# Patient Record
Sex: MALE | Race: WHITE | NOT HISPANIC OR LATINO | Employment: FULL TIME | URBAN - METROPOLITAN AREA
[De-identification: names, ages, dates, MRNs, and addresses within clinical notes are randomized per-mention and may not be internally consistent; named-entity substitution may affect disease eponyms.]

---

## 2022-01-11 ENCOUNTER — OFFICE VISIT (OUTPATIENT)
Dept: FAMILY MEDICINE | Age: 42
End: 2022-01-11

## 2022-01-11 VITALS
TEMPERATURE: 98 F | OXYGEN SATURATION: 100 % | HEIGHT: 73 IN | WEIGHT: 190 LBS | HEART RATE: 74 BPM | DIASTOLIC BLOOD PRESSURE: 84 MMHG | SYSTOLIC BLOOD PRESSURE: 128 MMHG | BODY MASS INDEX: 25.18 KG/M2

## 2022-01-11 DIAGNOSIS — R09.89 SINUS COMPLAINT: ICD-10-CM

## 2022-01-11 DIAGNOSIS — U07.1 COVID-19 VIRUS INFECTION: Primary | ICD-10-CM

## 2022-01-11 DIAGNOSIS — B00.1 HERPES LABIALIS: ICD-10-CM

## 2022-01-11 PROCEDURE — 99203 OFFICE O/P NEW LOW 30 MIN: CPT | Performed by: PHYSICIAN ASSISTANT

## 2022-01-11 RX ORDER — AZITHROMYCIN 250 MG/1
TABLET, FILM COATED ORAL
Qty: 6 TABLET | Refills: 0 | Status: SHIPPED | OUTPATIENT
Start: 2022-01-11 | End: 2022-01-16

## 2022-01-11 RX ORDER — VALACYCLOVIR HYDROCHLORIDE 1 G/1
TABLET, FILM COATED ORAL
Qty: 4 TABLET | Refills: 0 | Status: SHIPPED | OUTPATIENT
Start: 2022-01-11 | End: 2022-06-07

## 2022-01-11 ASSESSMENT — ENCOUNTER SYMPTOMS
CHEST TIGHTNESS: 0
SORE THROAT: 0
VOMITING: 0
CONSTIPATION: 0
FEVER: 1
POLYDIPSIA: 0
SHORTNESS OF BREATH: 0
DIARRHEA: 0
FATIGUE: 0
COLOR CHANGE: 0
ABDOMINAL PAIN: 0
SINUS PRESSURE: 1
HEADACHES: 0
NAUSEA: 0
CHILLS: 0
WHEEZING: 0
COUGH: 0

## 2022-03-03 ENCOUNTER — TELEPHONE (OUTPATIENT)
Dept: FAMILY MEDICINE | Age: 42
End: 2022-03-03

## 2022-03-03 DIAGNOSIS — Z00.00 ROUTINE GENERAL MEDICAL EXAMINATION AT A HEALTH CARE FACILITY: Primary | ICD-10-CM

## 2022-03-21 ENCOUNTER — IMAGING SERVICES (OUTPATIENT)
Dept: GENERAL RADIOLOGY | Age: 42
End: 2022-03-21
Attending: FAMILY MEDICINE

## 2022-03-21 ENCOUNTER — OFFICE VISIT (OUTPATIENT)
Dept: SPORTS MEDICINE | Age: 42
End: 2022-03-21

## 2022-03-21 ENCOUNTER — TELEPHONE (OUTPATIENT)
Dept: ORTHOPEDICS CLINIC | Facility: CLINIC | Age: 42
End: 2022-03-21

## 2022-03-21 VITALS — WEIGHT: 190 LBS | BODY MASS INDEX: 25.18 KG/M2 | HEIGHT: 73 IN

## 2022-03-21 DIAGNOSIS — M25.661 DECREASED RANGE OF MOTION (ROM) OF RIGHT KNEE: ICD-10-CM

## 2022-03-21 DIAGNOSIS — M25.561 RIGHT KNEE PAIN, UNSPECIFIED CHRONICITY: ICD-10-CM

## 2022-03-21 DIAGNOSIS — M23.91 ACUTE INTERNAL DERANGEMENT OF RIGHT KNEE: Primary | ICD-10-CM

## 2022-03-21 DIAGNOSIS — S83.511A RUPTURE OF ANTERIOR CRUCIATE LIGAMENT OF RIGHT KNEE, INITIAL ENCOUNTER: ICD-10-CM

## 2022-03-21 DIAGNOSIS — S83.421A TEAR OF LCL (LATERAL COLLATERAL LIGAMENT) OF KNEE, RIGHT, INITIAL ENCOUNTER: ICD-10-CM

## 2022-03-21 PROCEDURE — 99204 OFFICE O/P NEW MOD 45 MIN: CPT | Performed by: FAMILY MEDICINE

## 2022-03-21 PROCEDURE — 20611 DRAIN/INJ JOINT/BURSA W/US: CPT | Performed by: FAMILY MEDICINE

## 2022-03-21 PROCEDURE — 73564 X-RAY EXAM KNEE 4 OR MORE: CPT | Performed by: FAMILY MEDICINE

## 2022-03-21 RX ORDER — LIDOCAINE HYDROCHLORIDE 10 MG/ML
2 INJECTION, SOLUTION INFILTRATION; PERINEURAL
Status: COMPLETED | OUTPATIENT
Start: 2022-03-21 | End: 2022-03-21

## 2022-03-21 RX ORDER — MELOXICAM 15 MG/1
15 TABLET ORAL DAILY
Qty: 14 TABLET | Refills: 0 | Status: SHIPPED | OUTPATIENT
Start: 2022-03-21 | End: 2022-06-07

## 2022-03-21 RX ADMIN — LIDOCAINE HYDROCHLORIDE 2 ML: 10 INJECTION, SOLUTION INFILTRATION; PERINEURAL at 12:18

## 2022-03-21 ASSESSMENT — ENCOUNTER SYMPTOMS
NUMBNESS: 1
WEAKNESS: 0
BRUISES/BLEEDS EASILY: 0
ACTIVITY CHANGE: 0
FEVER: 0
DIARRHEA: 0
SHORTNESS OF BREATH: 0
NAUSEA: 0
PHOTOPHOBIA: 0
COLOR CHANGE: 0
ADENOPATHY: 0
WHEEZING: 0
VOMITING: 0
CHILLS: 1
CHEST TIGHTNESS: 0
BACK PAIN: 0
WOUND: 0

## 2022-03-21 NOTE — TELEPHONE ENCOUNTER
Noted pt is scheduled with a Dr Taj Nettles today through [facility]. No imaging on file. Orders placed for Right Knee XR WB & comparison views. Scheduled & spoke to pt, advising to arrive 30 min early to complete prior to appt. Pt voiced understanding.

## 2022-03-22 ENCOUNTER — TELEPHONE (OUTPATIENT)
Dept: SPORTS MEDICINE | Age: 42
End: 2022-03-22

## 2022-03-22 DIAGNOSIS — S83.511A RUPTURE OF ANTERIOR CRUCIATE LIGAMENT OF RIGHT KNEE, INITIAL ENCOUNTER: ICD-10-CM

## 2022-03-22 DIAGNOSIS — M25.661 DECREASED RANGE OF MOTION (ROM) OF RIGHT KNEE: ICD-10-CM

## 2022-03-22 DIAGNOSIS — M25.561 RIGHT KNEE PAIN, UNSPECIFIED CHRONICITY: ICD-10-CM

## 2022-03-22 DIAGNOSIS — S83.421A TEAR OF LCL (LATERAL COLLATERAL LIGAMENT) OF KNEE, RIGHT, INITIAL ENCOUNTER: ICD-10-CM

## 2022-03-22 DIAGNOSIS — M23.91 ACUTE INTERNAL DERANGEMENT OF RIGHT KNEE: Primary | ICD-10-CM

## 2022-05-25 ENCOUNTER — OFFICE VISIT (OUTPATIENT)
Dept: FAMILY MEDICINE | Age: 42
End: 2022-05-25

## 2022-05-25 ENCOUNTER — APPOINTMENT (OUTPATIENT)
Dept: FAMILY MEDICINE | Age: 42
End: 2022-05-25

## 2022-05-25 VITALS
DIASTOLIC BLOOD PRESSURE: 80 MMHG | OXYGEN SATURATION: 97 % | HEIGHT: 74 IN | HEART RATE: 82 BPM | WEIGHT: 196.8 LBS | SYSTOLIC BLOOD PRESSURE: 105 MMHG | RESPIRATION RATE: 12 BRPM | TEMPERATURE: 98.4 F | BODY MASS INDEX: 25.26 KG/M2

## 2022-05-25 DIAGNOSIS — Z98.890 S/P ACL RECONSTRUCTION: ICD-10-CM

## 2022-05-25 DIAGNOSIS — Z00.01 ENCOUNTER FOR GENERAL ADULT MEDICAL EXAMINATION WITH ABNORMAL FINDINGS: Primary | ICD-10-CM

## 2022-05-25 DIAGNOSIS — Z23 NEED FOR VACCINATION: ICD-10-CM

## 2022-05-25 PROCEDURE — 99396 PREV VISIT EST AGE 40-64: CPT | Performed by: FAMILY MEDICINE

## 2022-05-25 PROCEDURE — 90471 IMMUNIZATION ADMIN: CPT | Performed by: FAMILY MEDICINE

## 2022-05-25 PROCEDURE — 90715 TDAP VACCINE 7 YRS/> IM: CPT | Performed by: FAMILY MEDICINE

## 2022-05-25 RX ORDER — HYDROCODONE BITARTRATE AND ACETAMINOPHEN 5; 325 MG/1; MG/1
1 TABLET ORAL EVERY 6 HOURS PRN
COMMUNITY
End: 2022-06-07

## 2022-05-25 RX ORDER — ASPIRIN 325 MG
325 TABLET ORAL DAILY
COMMUNITY
End: 2022-06-07

## 2022-05-25 RX ORDER — ONDANSETRON 4 MG/1
4 TABLET, FILM COATED ORAL EVERY 8 HOURS PRN
COMMUNITY
End: 2022-06-07

## 2022-05-25 ASSESSMENT — PATIENT HEALTH QUESTIONNAIRE - PHQ9
CLINICAL INTERPRETATION OF PHQ2 SCORE: NO FURTHER SCREENING NEEDED
2. FEELING DOWN, DEPRESSED OR HOPELESS: NOT AT ALL
SUM OF ALL RESPONSES TO PHQ9 QUESTIONS 1 AND 2: 0
SUM OF ALL RESPONSES TO PHQ9 QUESTIONS 1 AND 2: 0
1. LITTLE INTEREST OR PLEASURE IN DOING THINGS: NOT AT ALL

## 2022-05-31 ENCOUNTER — TELEPHONE (OUTPATIENT)
Dept: FAMILY MEDICINE | Age: 42
End: 2022-05-31

## 2022-06-04 ENCOUNTER — TELEPHONE (OUTPATIENT)
Dept: FAMILY MEDICINE | Age: 42
End: 2022-06-04

## 2022-06-04 ENCOUNTER — APPOINTMENT (OUTPATIENT)
Dept: FAMILY MEDICINE | Age: 42
End: 2022-06-04

## 2022-06-07 ENCOUNTER — OFFICE VISIT (OUTPATIENT)
Dept: FAMILY MEDICINE | Age: 42
End: 2022-06-07

## 2022-06-07 VITALS
HEART RATE: 82 BPM | SYSTOLIC BLOOD PRESSURE: 140 MMHG | DIASTOLIC BLOOD PRESSURE: 90 MMHG | BODY MASS INDEX: 25.68 KG/M2 | OXYGEN SATURATION: 99 % | WEIGHT: 200 LBS

## 2022-06-07 DIAGNOSIS — R59.9 ENLARGED LYMPH NODES: ICD-10-CM

## 2022-06-07 DIAGNOSIS — I82.451 ACUTE DEEP VEIN THROMBOSIS (DVT) OF RIGHT PERONEAL VEIN (CMD): Primary | ICD-10-CM

## 2022-06-07 PROCEDURE — 99214 OFFICE O/P EST MOD 30 MIN: CPT | Performed by: PHYSICIAN ASSISTANT

## 2022-06-07 RX ORDER — RIVAROXABAN 15 MG-20MG
KIT ORAL
COMMUNITY
Start: 2022-05-31

## 2022-06-07 RX ORDER — OXYCODONE HYDROCHLORIDE AND ACETAMINOPHEN 5; 325 MG/1; MG/1
1 TABLET ORAL
COMMUNITY
Start: 2022-05-12

## 2022-06-29 ENCOUNTER — TELEPHONE (OUTPATIENT)
Dept: PEDIATRICS | Age: 42
End: 2022-06-29

## 2022-06-29 DIAGNOSIS — Z29.89 NEED FOR PROPHYLACTIC IMMUNOTHERAPY: Primary | ICD-10-CM

## 2022-07-08 ENCOUNTER — LAB SERVICES (OUTPATIENT)
Dept: LAB | Age: 42
End: 2022-07-08

## 2022-07-08 DIAGNOSIS — Z00.01 ENCOUNTER FOR GENERAL ADULT MEDICAL EXAMINATION WITH ABNORMAL FINDINGS: ICD-10-CM

## 2022-07-08 DIAGNOSIS — Z00.00 ROUTINE GENERAL MEDICAL EXAMINATION AT A HEALTH CARE FACILITY: ICD-10-CM

## 2022-07-08 LAB
ALBUMIN SERPL-MCNC: 4.6 G/DL (ref 3.6–5.1)
ALP SERPL-CCNC: 113 U/L (ref 45–115)
ALT SERPL W/O P-5'-P-CCNC: 51 U/L (ref 5–49)
AST SERPL-CCNC: 32 U/L (ref 14–43)
BASOPHIL %: 0.6 % (ref 0–1.2)
BASOPHIL ABSOLUTE #: 0 10*3/UL (ref 0–0.1)
BILIRUB SERPL-MCNC: 0.9 MG/DL (ref 0–1.3)
BUN SERPL-MCNC: 20 MG/DL (ref 6–27)
CALCIUM SERPL-MCNC: 10 MG/DL (ref 8.6–10.6)
CHLORIDE SERPL-SCNC: 104 MMOL/L (ref 96–107)
CHOLEST SERPL-MCNC: 247 MG/DL (ref 140–200)
CO2 SERPL-SCNC: 29 MMOL/L (ref 22–32)
CREAT SERPL-MCNC: 1 MG/DL (ref 0.6–1.6)
DIFFERENTIAL TYPE: NORMAL
EOSINOPHIL %: 4.8 % (ref 0–10)
EOSINOPHIL ABSOLUTE #: 0.3 10*3/UL (ref 0–0.5)
GFR SERPL CREATININE-BSD FRML MDRD: >60 ML/MIN/{1.73M2}
GFR SERPL CREATININE-BSD FRML MDRD: >60 ML/MIN/{1.73M2}
GLUCOSE P FAST SERPL-MCNC: 97 MG/DL (ref 60–100)
HDLC SERPL-MCNC: 38 MG/DL
HEMATOCRIT: 43.8 % (ref 40–51)
HEMOGLOBIN: 14.7 G/DL (ref 13.7–17.5)
IMMATURE GRANULOCYTE ABSOLUTE: 0.01 10*3/UL (ref 0–0.05)
IMMATURE GRANULOCYTE PERCENT: 0.1 % (ref 0–0.5)
LDLC SERPL CALC-MCNC: 180 MG/DL (ref 30–100)
LYMPH PERCENT: 36.7 % (ref 20.5–51.1)
LYMPHOCYTE ABSOLUTE #: 2.6 10*3/UL (ref 1.2–3.4)
MEAN CORPUSCULAR HGB CONCENTRATION: 33.6 % (ref 32–36)
MEAN CORPUSCULAR HGB: 29.6 PG (ref 27–34)
MEAN CORPUSCULAR VOLUME: 88.3 FL (ref 79–95)
MEAN PLATELET VOLUME: 10.3 FL (ref 8.6–12.4)
MONOCYTE ABSOLUTE #: 0.5 10*3/UL (ref 0.2–0.9)
MONOCYTE PERCENT: 7.6 % (ref 4.3–12.9)
NEUTROPHIL ABSOLUTE #: 3.6 10*3/UL (ref 1.4–6.5)
NEUTROPHIL PERCENT: 50.2 % (ref 34–73.5)
PLATELET COUNT: 351 10*3/UL (ref 150–400)
POTASSIUM SERPL-SCNC: 4.8 MMOL/L (ref 3.5–5.3)
PROT SERPL-MCNC: 7.3 G/DL (ref 6.4–8.5)
RED BLOOD CELL COUNT: 4.96 10*6/UL (ref 3.9–5.7)
RED CELL DISTRIBUTION WIDTH: 12.9 % (ref 11.3–14.8)
SODIUM SERPL-SCNC: 139 MMOL/L (ref 136–146)
TESTOST SERPL-MCNC: 190 NG/DL (ref 200–813)
TRIGL SERPL-MCNC: 144 MG/DL (ref 0–200)
TSH SERPL DL<=0.05 MIU/L-ACNC: 2.25 M[IU]/L (ref 0.3–4.82)
WHITE BLOOD CELL COUNT: 7.1 10*3/UL (ref 4–10)

## 2022-07-08 PROCEDURE — 80061 LIPID PANEL: CPT | Performed by: FAMILY MEDICINE

## 2022-07-08 PROCEDURE — 84403 ASSAY OF TOTAL TESTOSTERONE: CPT | Performed by: FAMILY MEDICINE

## 2022-07-08 PROCEDURE — 36415 COLL VENOUS BLD VENIPUNCTURE: CPT | Performed by: FAMILY MEDICINE

## 2022-07-08 PROCEDURE — 80050 GENERAL HEALTH PANEL: CPT | Performed by: FAMILY MEDICINE

## 2022-07-12 ENCOUNTER — TELEPHONE (OUTPATIENT)
Dept: FAMILY MEDICINE | Age: 42
End: 2022-07-12

## 2022-12-23 ENCOUNTER — HOSPITAL ENCOUNTER (EMERGENCY)
Facility: HOSPITAL | Age: 42
Discharge: HOME | End: 2022-12-23
Attending: EMERGENCY MEDICINE
Payer: COMMERCIAL

## 2022-12-23 ENCOUNTER — APPOINTMENT (OUTPATIENT)
Dept: CV DIAGNOSTICS | Facility: HOSPITAL | Age: 42
End: 2022-12-23
Attending: EMERGENCY MEDICINE
Payer: COMMERCIAL

## 2022-12-23 ENCOUNTER — APPOINTMENT (OUTPATIENT)
Dept: GENERAL RADIOLOGY | Facility: HOSPITAL | Age: 42
End: 2022-12-23
Attending: EMERGENCY MEDICINE
Payer: COMMERCIAL

## 2022-12-23 VITALS
DIASTOLIC BLOOD PRESSURE: 89 MMHG | OXYGEN SATURATION: 99 % | TEMPERATURE: 99 F | SYSTOLIC BLOOD PRESSURE: 133 MMHG | BODY MASS INDEX: 25.67 KG/M2 | WEIGHT: 200 LBS | RESPIRATION RATE: 14 BRPM | HEART RATE: 80 BPM | HEIGHT: 74 IN

## 2022-12-23 DIAGNOSIS — I15.9 SECONDARY HYPERTENSION: ICD-10-CM

## 2022-12-23 DIAGNOSIS — R07.9 CHEST PAIN OF UNCERTAIN ETIOLOGY: Primary | ICD-10-CM

## 2022-12-23 LAB
ALBUMIN SERPL-MCNC: 3.9 G/DL (ref 3.4–5)
ALBUMIN/GLOB SERPL: 1.1 {RATIO} (ref 1–2)
ALP LIVER SERPL-CCNC: 85 U/L
ALT SERPL-CCNC: 32 U/L
ANION GAP SERPL CALC-SCNC: 4 MMOL/L (ref 0–18)
AST SERPL-CCNC: 17 U/L (ref 15–37)
ATRIAL RATE: 104 BPM
BASOPHILS # BLD AUTO: 0.04 X10(3) UL (ref 0–0.2)
BASOPHILS NFR BLD AUTO: 0.5 %
BILIRUB SERPL-MCNC: 1.4 MG/DL (ref 0.1–2)
BUN BLD-MCNC: 14 MG/DL (ref 7–18)
CALCIUM BLD-MCNC: 9.7 MG/DL (ref 8.5–10.1)
CHLORIDE SERPL-SCNC: 104 MMOL/L (ref 98–112)
CO2 SERPL-SCNC: 28 MMOL/L (ref 21–32)
CREAT BLD-MCNC: 1.22 MG/DL
D DIMER PPP FEU-MCNC: <0.27 UG/ML FEU (ref ?–0.5)
EOSINOPHIL # BLD AUTO: 0.25 X10(3) UL (ref 0–0.7)
EOSINOPHIL NFR BLD AUTO: 3.1 %
ERYTHROCYTE [DISTWIDTH] IN BLOOD BY AUTOMATED COUNT: 14.4 %
GFR SERPLBLD BASED ON 1.73 SQ M-ARVRAT: 75 ML/MIN/1.73M2 (ref 60–?)
GLOBULIN PLAS-MCNC: 3.6 G/DL (ref 2.8–4.4)
GLUCOSE BLD-MCNC: 111 MG/DL (ref 70–99)
HCT VFR BLD AUTO: 52.1 %
HGB BLD-MCNC: 17.3 G/DL
IMM GRANULOCYTES # BLD AUTO: 0.02 X10(3) UL (ref 0–1)
IMM GRANULOCYTES NFR BLD: 0.2 %
LYMPHOCYTES # BLD AUTO: 1.82 X10(3) UL (ref 1–4)
LYMPHOCYTES NFR BLD AUTO: 22.5 %
MCH RBC QN AUTO: 29 PG (ref 26–34)
MCHC RBC AUTO-ENTMCNC: 33.2 G/DL (ref 31–37)
MCV RBC AUTO: 87.4 FL
MONOCYTES # BLD AUTO: 0.68 X10(3) UL (ref 0.1–1)
MONOCYTES NFR BLD AUTO: 8.4 %
NEUTROPHILS # BLD AUTO: 5.29 X10 (3) UL (ref 1.5–7.7)
NEUTROPHILS # BLD AUTO: 5.29 X10(3) UL (ref 1.5–7.7)
NEUTROPHILS NFR BLD AUTO: 65.3 %
NT-PROBNP SERPL-MCNC: 17 PG/ML (ref ?–125)
OSMOLALITY SERPL CALC.SUM OF ELEC: 283 MOSM/KG (ref 275–295)
P AXIS: 65 DEGREES
P-R INTERVAL: 150 MS
PLATELET # BLD AUTO: 267 10(3)UL (ref 150–450)
POTASSIUM SERPL-SCNC: 4.2 MMOL/L (ref 3.5–5.1)
PROT SERPL-MCNC: 7.5 G/DL (ref 6.4–8.2)
Q-T INTERVAL: 324 MS
QRS DURATION: 100 MS
QTC CALCULATION (BEZET): 426 MS
R AXIS: -46 DEGREES
RBC # BLD AUTO: 5.96 X10(6)UL
SARS-COV-2 RNA RESP QL NAA+PROBE: NOT DETECTED
SODIUM SERPL-SCNC: 136 MMOL/L (ref 136–145)
T AXIS: 43 DEGREES
TROPONIN I HIGH SENSITIVITY: 4 NG/L
VENTRICULAR RATE: 104 BPM
WBC # BLD AUTO: 8.1 X10(3) UL (ref 4–11)

## 2022-12-23 PROCEDURE — 99285 EMERGENCY DEPT VISIT HI MDM: CPT

## 2022-12-23 PROCEDURE — 85379 FIBRIN DEGRADATION QUANT: CPT | Performed by: EMERGENCY MEDICINE

## 2022-12-23 PROCEDURE — 84484 ASSAY OF TROPONIN QUANT: CPT | Performed by: EMERGENCY MEDICINE

## 2022-12-23 PROCEDURE — 85025 COMPLETE CBC W/AUTO DIFF WBC: CPT | Performed by: EMERGENCY MEDICINE

## 2022-12-23 PROCEDURE — 80053 COMPREHEN METABOLIC PANEL: CPT | Performed by: EMERGENCY MEDICINE

## 2022-12-23 PROCEDURE — 93350 STRESS TTE ONLY: CPT | Performed by: EMERGENCY MEDICINE

## 2022-12-23 PROCEDURE — 93010 ELECTROCARDIOGRAM REPORT: CPT

## 2022-12-23 PROCEDURE — 93005 ELECTROCARDIOGRAM TRACING: CPT

## 2022-12-23 PROCEDURE — 83880 ASSAY OF NATRIURETIC PEPTIDE: CPT | Performed by: EMERGENCY MEDICINE

## 2022-12-23 PROCEDURE — 93017 CV STRESS TEST TRACING ONLY: CPT | Performed by: EMERGENCY MEDICINE

## 2022-12-23 PROCEDURE — 71045 X-RAY EXAM CHEST 1 VIEW: CPT | Performed by: EMERGENCY MEDICINE

## 2022-12-23 PROCEDURE — 36415 COLL VENOUS BLD VENIPUNCTURE: CPT

## 2022-12-23 RX ORDER — AMLODIPINE BESYLATE 5 MG/1
5 TABLET ORAL DAILY
Qty: 30 TABLET | Refills: 0 | Status: SHIPPED | OUTPATIENT
Start: 2022-12-23 | End: 2023-01-22

## 2022-12-23 RX ORDER — ASPIRIN 81 MG/1
324 TABLET, CHEWABLE ORAL ONCE
Status: COMPLETED | OUTPATIENT
Start: 2022-12-23 | End: 2022-12-23

## 2022-12-23 NOTE — PROGRESS NOTES
Cardio Diagnostics:    Patient walked 10 minutes on a manual Parviz protocol achieving 91% of APMHR. Pt c/o \"chest fluttering\" sensation during early exercise but resolved at peak. Echo images pending. Test turned into [facility].

## 2022-12-23 NOTE — DISCHARGE INSTRUCTIONS
Follow-up with your own primary care physician. Recheck your blood pressure if is persistently over 160/90 start the Norvasc otherwise just follow-up with your doctor for further evaluation. If you have any severe chest pain or shortness of breath return to the emergency room. You were seen in the emergency room in a limited time. There is a possibility that although we do not see any acute process at this present time that things can change with time. Is therefore imperative that you follow-up with primary care physician for close follow-up. If there is any significant progression of your pain  or other symptoms you to return immediately to the emergency room.

## 2022-12-23 NOTE — ED INITIAL ASSESSMENT (HPI)
Pt reports he took Cialis on Monday and last Friday and since has been feeling his blood pressure has been going through the roof and chest pain. + SOB and dizzy at work

## 2023-04-24 ENCOUNTER — HOSPITAL ENCOUNTER (OUTPATIENT)
Age: 43
Discharge: HOME | End: 2023-04-24
Payer: COMMERCIAL

## 2023-04-24 VITALS
RESPIRATION RATE: 18 BRPM | HEART RATE: 83 BPM | OXYGEN SATURATION: 98 % | TEMPERATURE: 98 F | DIASTOLIC BLOOD PRESSURE: 80 MMHG | SYSTOLIC BLOOD PRESSURE: 140 MMHG

## 2023-04-24 DIAGNOSIS — J02.9 VIRAL PHARYNGITIS: ICD-10-CM

## 2023-04-24 DIAGNOSIS — J01.00 ACUTE NON-RECURRENT MAXILLARY SINUSITIS: Primary | ICD-10-CM

## 2023-04-24 LAB — S PYO AG THROAT QL: NEGATIVE

## 2023-04-24 PROCEDURE — 99203 OFFICE O/P NEW LOW 30 MIN: CPT | Performed by: PHYSICIAN ASSISTANT

## 2023-04-24 PROCEDURE — 87880 STREP A ASSAY W/OPTIC: CPT | Performed by: PHYSICIAN ASSISTANT

## 2023-04-24 RX ORDER — AMOXICILLIN AND CLAVULANATE POTASSIUM 875; 125 MG/1; MG/1
1 TABLET, FILM COATED ORAL 2 TIMES DAILY
Qty: 20 TABLET | Refills: 0 | Status: SHIPPED | OUTPATIENT
Start: 2023-04-24 | End: 2023-05-04

## 2023-04-24 NOTE — DISCHARGE INSTRUCTIONS
Push clear fluids. Continue Flonase. Antihistamine such as Zyrtec or Claritin. Antibiotic as written.   Throw a toothbrush on day 3 of treatment

## 2023-08-07 ENCOUNTER — HOSPITAL ENCOUNTER (EMERGENCY)
Facility: HOSPITAL | Age: 43
Discharge: HOME | End: 2023-08-07
Attending: EMERGENCY MEDICINE
Payer: COMMERCIAL

## 2023-08-07 ENCOUNTER — APPOINTMENT (OUTPATIENT)
Dept: ULTRASOUND IMAGING | Facility: HOSPITAL | Age: 43
End: 2023-08-07
Attending: EMERGENCY MEDICINE
Payer: COMMERCIAL

## 2023-08-07 ENCOUNTER — APPOINTMENT (OUTPATIENT)
Dept: CT IMAGING | Facility: HOSPITAL | Age: 43
End: 2023-08-07
Attending: EMERGENCY MEDICINE
Payer: COMMERCIAL

## 2023-08-07 VITALS
WEIGHT: 195 LBS | SYSTOLIC BLOOD PRESSURE: 144 MMHG | RESPIRATION RATE: 16 BRPM | HEIGHT: 74 IN | DIASTOLIC BLOOD PRESSURE: 78 MMHG | BODY MASS INDEX: 25.03 KG/M2 | HEART RATE: 78 BPM | OXYGEN SATURATION: 97 % | TEMPERATURE: 98 F

## 2023-08-07 DIAGNOSIS — R06.02 SHORTNESS OF BREATH: Primary | ICD-10-CM

## 2023-08-07 LAB
ALBUMIN SERPL-MCNC: 4.5 G/DL (ref 3.4–5)
ALBUMIN/GLOB SERPL: 1.3 {RATIO} (ref 1–2)
ALP LIVER SERPL-CCNC: 94 U/L
ALT SERPL-CCNC: 52 U/L
ANION GAP SERPL CALC-SCNC: 8 MMOL/L (ref 0–18)
AST SERPL-CCNC: 55 U/L (ref 15–37)
ATRIAL RATE: 88 BPM
BASOPHILS # BLD AUTO: 0.04 X10(3) UL (ref 0–0.2)
BASOPHILS NFR BLD AUTO: 0.5 %
BILIRUB SERPL-MCNC: 1.3 MG/DL (ref 0.1–2)
BUN BLD-MCNC: 17 MG/DL (ref 7–18)
CALCIUM BLD-MCNC: 9.3 MG/DL (ref 8.5–10.1)
CHLORIDE SERPL-SCNC: 105 MMOL/L (ref 98–112)
CO2 SERPL-SCNC: 23 MMOL/L (ref 21–32)
CREAT BLD-MCNC: 1.22 MG/DL
D DIMER PPP FEU-MCNC: 0.52 UG/ML FEU (ref ?–0.5)
EGFRCR SERPLBLD CKD-EPI 2021: 75 ML/MIN/1.73M2 (ref 60–?)
EOSINOPHIL # BLD AUTO: 0.23 X10(3) UL (ref 0–0.7)
EOSINOPHIL NFR BLD AUTO: 2.9 %
ERYTHROCYTE [DISTWIDTH] IN BLOOD BY AUTOMATED COUNT: 13 %
GLOBULIN PLAS-MCNC: 3.6 G/DL (ref 2.8–4.4)
GLUCOSE BLD-MCNC: 89 MG/DL (ref 70–99)
HCT VFR BLD AUTO: 54 %
HGB BLD-MCNC: 19.3 G/DL
IMM GRANULOCYTES # BLD AUTO: 0.02 X10(3) UL (ref 0–1)
IMM GRANULOCYTES NFR BLD: 0.3 %
LYMPHOCYTES # BLD AUTO: 1.93 X10(3) UL (ref 1–4)
LYMPHOCYTES NFR BLD AUTO: 24.7 %
MCH RBC QN AUTO: 31 PG (ref 26–34)
MCHC RBC AUTO-ENTMCNC: 35.7 G/DL (ref 31–37)
MCV RBC AUTO: 86.7 FL
MONOCYTES # BLD AUTO: 0.67 X10(3) UL (ref 0.1–1)
MONOCYTES NFR BLD AUTO: 8.6 %
NEUTROPHILS # BLD AUTO: 4.92 X10 (3) UL (ref 1.5–7.7)
NEUTROPHILS # BLD AUTO: 4.92 X10(3) UL (ref 1.5–7.7)
NEUTROPHILS NFR BLD AUTO: 63 %
NT-PROBNP SERPL-MCNC: 23 PG/ML (ref ?–125)
OSMOLALITY SERPL CALC.SUM OF ELEC: 283 MOSM/KG (ref 275–295)
P AXIS: 62 DEGREES
P-R INTERVAL: 152 MS
PLATELET # BLD AUTO: 295 10(3)UL (ref 150–450)
POTASSIUM SERPL-SCNC: 3.9 MMOL/L (ref 3.5–5.1)
PROT SERPL-MCNC: 8.1 G/DL (ref 6.4–8.2)
Q-T INTERVAL: 328 MS
QRS DURATION: 106 MS
QTC CALCULATION (BEZET): 396 MS
R AXIS: 238 DEGREES
RBC # BLD AUTO: 6.23 X10(6)UL
SODIUM SERPL-SCNC: 136 MMOL/L (ref 136–145)
T AXIS: 29 DEGREES
TROPONIN I HIGH SENSITIVITY: 7 NG/L
VENTRICULAR RATE: 88 BPM
WBC # BLD AUTO: 7.8 X10(3) UL (ref 4–11)

## 2023-08-07 PROCEDURE — 85025 COMPLETE CBC W/AUTO DIFF WBC: CPT

## 2023-08-07 PROCEDURE — 80053 COMPREHEN METABOLIC PANEL: CPT

## 2023-08-07 PROCEDURE — 83880 ASSAY OF NATRIURETIC PEPTIDE: CPT | Performed by: EMERGENCY MEDICINE

## 2023-08-07 PROCEDURE — 71260 CT THORAX DX C+: CPT | Performed by: EMERGENCY MEDICINE

## 2023-08-07 PROCEDURE — 85379 FIBRIN DEGRADATION QUANT: CPT | Performed by: EMERGENCY MEDICINE

## 2023-08-07 PROCEDURE — 99285 EMERGENCY DEPT VISIT HI MDM: CPT

## 2023-08-07 PROCEDURE — 93970 EXTREMITY STUDY: CPT | Performed by: EMERGENCY MEDICINE

## 2023-08-07 PROCEDURE — 93010 ELECTROCARDIOGRAM REPORT: CPT

## 2023-08-07 PROCEDURE — 99284 EMERGENCY DEPT VISIT MOD MDM: CPT

## 2023-08-07 PROCEDURE — 36415 COLL VENOUS BLD VENIPUNCTURE: CPT

## 2023-08-07 PROCEDURE — 93005 ELECTROCARDIOGRAM TRACING: CPT

## 2023-08-07 PROCEDURE — 84484 ASSAY OF TROPONIN QUANT: CPT

## 2023-08-07 NOTE — DISCHARGE INSTRUCTIONS
Regular activity and exercise is fine. Follow-up with your primary care physician to keep benign your blood pressure.

## 2023-08-07 NOTE — ED INITIAL ASSESSMENT (HPI)
The past 2 week  pt with difficulty in breathing and high blood pressure. With numbness to fingers both hands started on her way here. Pt states he took 2 baby aspirins at 0230 today.

## 2023-08-07 NOTE — ED QUICK NOTES
Pt reevaluated by er physician. Pt informed of his test reports and plan of care.  Verbalizing understanding

## 2024-02-14 ENCOUNTER — HOSPITAL ENCOUNTER (OUTPATIENT)
Age: 44
Discharge: HOME | End: 2024-02-14
Payer: COMMERCIAL

## 2024-02-14 VITALS
RESPIRATION RATE: 18 BRPM | HEART RATE: 90 BPM | TEMPERATURE: 97 F | HEIGHT: 73 IN | WEIGHT: 190 LBS | DIASTOLIC BLOOD PRESSURE: 80 MMHG | BODY MASS INDEX: 25.18 KG/M2 | SYSTOLIC BLOOD PRESSURE: 125 MMHG | OXYGEN SATURATION: 98 %

## 2024-02-14 DIAGNOSIS — J20.9 ACUTE BRONCHITIS, UNSPECIFIED ORGANISM: Primary | ICD-10-CM

## 2024-02-14 PROCEDURE — 99203 OFFICE O/P NEW LOW 30 MIN: CPT | Performed by: NURSE PRACTITIONER

## 2024-02-14 RX ORDER — PREDNISONE 20 MG/1
40 TABLET ORAL DAILY
Qty: 10 TABLET | Refills: 0 | Status: SHIPPED | OUTPATIENT
Start: 2024-02-14 | End: 2024-02-19

## 2024-02-14 RX ORDER — DOXYCYCLINE HYCLATE 100 MG/1
100 CAPSULE ORAL 2 TIMES DAILY
COMMUNITY

## 2024-02-14 RX ORDER — ALBUTEROL SULFATE 90 UG/1
2 AEROSOL, METERED RESPIRATORY (INHALATION) EVERY 4 HOURS PRN
Qty: 1 EACH | Refills: 0 | Status: SHIPPED | OUTPATIENT
Start: 2024-02-14 | End: 2024-03-15

## 2024-02-14 NOTE — ED PROVIDER NOTES
Patient Seen in: [facility]      History     Chief Complaint   Patient presents with    Cough     Stated Complaint: chest discomfort x 3 weeks    Subjective:   43-year-old male presents today with history of URI symptoms and cough.   recently over the last couple days has started feeling chest congestion and shortness of breath.  Does at times feel anxious with this feeling of shortness of breath.  Denies denies any chest pain.   was seen twice in the ER at the end of last year for anxiety and had full cardiac workup at that time which was negative.  Does not take anything for anxiety.  Denies any fever or chills.  No nausea vomiting.  Alert oriented x 3.  No other symptoms or concerns.  The patient's medication list, past medical history and social history elements as listed in today's nurse's notes were reviewed and agreed (except as otherwise stated in the HPI).  The patient's family history reviewed and determined to be noncontributory to the presenting problem            Objective:   History reviewed. No pertinent past medical history.           Past Surgical History:   Procedure Laterality Date    OTHER SURGICAL HISTORY                  Social History     Socioeconomic History    Marital status: Single   Tobacco Use    Smoking status: Former     Packs/day: .75     Types: Cigarettes     Passive exposure: Never    Smokeless tobacco: Never              Review of Systems    Positive for stated complaint: chest discomfort x 3 weeks  Other systems are as noted in HPI.  Constitutional and vital signs reviewed.      All other systems reviewed and negative except as noted above.    Physical Exam     ED Triage Vitals [02/14/24 1500]   /80   Pulse 90   Resp 18   Temp 97 °F (36.1 °C)   Temp src Temporal   SpO2 98 %   O2 Device None (Room air)       Current:/80   Pulse 90   Temp 97 °F (36.1 °C) (Temporal)   Resp 18   Ht 185.4 cm (6' 1\")   Wt 86.2 kg   SpO2 98%   BMI 25.07 kg/m²          Physical Exam  Vitals and nursing note reviewed.   Constitutional:       Appearance: Normal appearance.   HENT:      Head: Normocephalic.      Nose: Nose normal.      Mouth/Throat:      Mouth: Mucous membranes are moist.   Cardiovascular:      Rate and Rhythm: Normal rate and regular rhythm.   Pulmonary:      Effort: Pulmonary effort is normal.      Breath sounds: Normal breath sounds.   Musculoskeletal:      Cervical back: Normal range of motion and neck supple.   Skin:     General: Skin is warm and dry.   Neurological:      Mental Status: He is alert and oriented to person, place, and time.               ED Course   Labs Reviewed - No data to display                          MDM     Please note that this report has been produced using speech recognition software and may contain errors related to that system including, but not limited to, errors in grammar, punctuation, and spelling, as well as words and phrases that possibly may have been recognized inappropriately.  If there are any questions or concerns, contact the dictating provider for clarification.        Note to patient: The 21st Century Cures Act makes medical notes like these available to patients in the interest of transparency. However, this is a medical document intended as peer to peer communication. It is written in medical language and may contain abbreviations or verbiage that are unfamiliar. It may appear blunt or direct. Medical documents are intended to carry relevant information, facts as evident, and the clinical opinion of the practitioner.                                   Medical Decision Making  Differential diagnosis includes but is not limited to: Acute MI, pulmonary embolism, pleurisy, muscle strain, aneurysm, pneumonia, bronchitis, anxiety      Presents today with complaints of feeling chest congestion with cough and intermittent shortness of breath.  States symptoms seem to be worse when laying flat.  Has had mild URI  symptoms and cough over the last couple weeks.  Is currently taking doxycycline for a separate issue.  Lungs were clear on exam.  Patient declines EKG at this time states that had a full cardiac workup in December.  Does not feel like it is his heart.  Patient denies any fever or chills.  Explained to patient could possibly be bronchitis related to his cough or possibly his anxiety.  Will start patient on prednisone and give prescription for albuterol inhaler to use as directed.  To take over-the-counter antihistamine such as Zyrtec Allegra Claritin or Xyzal.  If symptoms do not improve after 1 week to follow-up with primary care physician for further evaluation and care.  Will also give information for [facility] for concerns for anxiety.  Patient verbalized understanding and agreed to plan of care.    Risk  OTC drugs.  Prescription drug management.        Disposition and Plan     Clinical Impression:  1. Acute bronchitis, unspecified organism         Disposition:  Discharge  2/14/2024  3:18 pm    Follow-up:  Estefania Payne MD  [address] [facility] [address] [phone]    Schedule an appointment as soon as possible for a visit       [facility] [address] [phone]    for feelings of anxiety          Medications Prescribed:  Current Discharge Medication List        START taking these medications    Details   predniSONE 20 MG Oral Tab Take 2 tablets (40 mg total) by mouth daily for 5 days.  Qty: 10 tablet, Refills: 0      albuterol 108 (90 Base) MCG/ACT Inhalation Aero Soln Inhale 2 puffs into the lungs every 4 (four) hours as needed for Wheezing.  Qty: 1 each, Refills: 0

## 2024-02-14 NOTE — ED INITIAL ASSESSMENT (HPI)
Patient c/o cough for 2 weeks. States \"it feels cool when I take a deep breath and I cough when I take a deep breath\".

## 2024-06-30 ENCOUNTER — HOSPITAL ENCOUNTER (OUTPATIENT)
Age: 44
Discharge: HOME | End: 2024-06-30
Payer: COMMERCIAL

## 2024-06-30 ENCOUNTER — APPOINTMENT (OUTPATIENT)
Dept: GENERAL RADIOLOGY | Age: 44
End: 2024-06-30
Attending: NURSE PRACTITIONER
Payer: COMMERCIAL

## 2024-06-30 VITALS
HEART RATE: 78 BPM | SYSTOLIC BLOOD PRESSURE: 161 MMHG | DIASTOLIC BLOOD PRESSURE: 98 MMHG | TEMPERATURE: 97 F | OXYGEN SATURATION: 99 % | RESPIRATION RATE: 16 BRPM | BODY MASS INDEX: 24.38 KG/M2 | WEIGHT: 190 LBS | HEIGHT: 74 IN

## 2024-06-30 DIAGNOSIS — S61.451A DOG BITE OF RIGHT HAND, INITIAL ENCOUNTER: Primary | ICD-10-CM

## 2024-06-30 DIAGNOSIS — L03.113 CELLULITIS OF RIGHT UPPER EXTREMITY: ICD-10-CM
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
## 2024-06-30 PROCEDURE — 73130 X-RAY EXAM OF HAND: CPT | Performed by: NURSE PRACTITIONER

## 2024-06-30 PROCEDURE — A9150 MISC/EXPER NON-PRESCRIPT DRU: HCPCS | Performed by: NURSE PRACTITIONER

## 2024-06-30 PROCEDURE — 96365 THER/PROPH/DIAG IV INF INIT: CPT | Performed by: NURSE PRACTITIONER

## 2024-06-30 PROCEDURE — 99213 OFFICE O/P EST LOW 20 MIN: CPT | Performed by: NURSE PRACTITIONER

## 2024-06-30 RX ORDER — ACETAMINOPHEN 500 MG
1000 TABLET ORAL ONCE
Status: COMPLETED | OUTPATIENT
Start: 2024-06-30 | End: 2024-06-30

## 2024-06-30 RX ORDER — AMOXICILLIN AND CLAVULANATE POTASSIUM 875; 125 MG/1; MG/1
1 TABLET, FILM COATED ORAL 2 TIMES DAILY
Qty: 20 TABLET | Refills: 0 | Status: SHIPPED | OUTPATIENT
Start: 2024-06-30 | End: 2024-07-08

## 2024-06-30 NOTE — ED PROVIDER NOTES
Patient Seen in: [facility]      History     Chief Complaint   Patient presents with    Animal Bite     Stated Complaint: dog bite    Subjective:   HPI  43-year-old male presents to me care with complaints of a right hand injury after sustaining a dog bite last night to his wife's dog.  Patient he was playing the dog and the dog suddenly snapped and bit his hand now has an increase in swelling and pain to the dorsal aspect of the hand.  Patient states his last tetanus shot was he thinks about 5 years ago not too sure.  No fever no drainage from the wound.  No other issues complaints concerns.  The patient's medication list, past medical history and social history elements as listed in today's nurse's notes were reviewed and agreed (except as otherwise stated in the HPI).  The patient's family history reviewed and determined to be noncontributory to the presenting problem.      Objective:   History reviewed. No pertinent past medical history.           Past Surgical History:   Procedure Laterality Date    Other surgical history                  Social History     Socioeconomic History    Marital status: Single   Tobacco Use    Smoking status: Former     Current packs/day: 0.75     Types: Cigarettes     Passive exposure: Never    Smokeless tobacco: Never     Social Determinants of Health      [facility]    Social Connections    [facility]    Housing Stability              Review of Systems    Positive for stated Chief Complaint: Animal Bite    Other systems are as noted in HPI.  Constitutional and vital signs reviewed.      All other systems reviewed and negative except as noted above.    Physical Exam     ED Triage Vitals [06/30/24 0945]   BP (!) 161/98   Pulse 78   Resp 16   Temp 97.4 °F (36.3 °C)   Temp src Temporal   SpO2 99 %   O2 Device None (Room air)       Current  Vitals:   Vital Signs  BP: (!) 161/98  Pulse: 78  Resp: 16  Temp: 97.4 °F (36.3 °C)  Temp src: Temporal    Oxygen Therapy  SpO2: 99 %  O2 Device: None (Room air)            Physical Exam    GENERAL: The patient is well-developed well-nourished nontoxic, non-ill-appearing  CHEST/LUNGS: .  There is no respiratory distress noted.  HEART/CARDIOVASCULAR:  There is no tachycardia.   SKIN: 2 small punctures are noted to the dorsal aspect of the hand 1 over the third MCP joint with mild area of erythema no red streaks are noted into the dorsal aspect of the hand, mild pain with  strength but patient has full range of motion of all the digits with flexion extension.  Sensations intact.  +2 radial pulse cap refill less than 2 seconds  NEURO: The patient is awake, alert, and oriented.  The patient is cooperative.    ED Course   Labs Reviewed - No data to display  XR HAND (MIN 3 VIEWS), RIGHT (CPT=73130)    Result Date: 6/30/2024  PROCEDURE:  XR HAND (MIN 3 VIEWS), RIGHT (CPT=73130)  TECHNIQUE:  Three views of the right hand were obtained.  COMPARISON:  None.  INDICATIONS:  dog bite to the right hand with pain, redness and swelling over the 2nd and 3rd metacarpals.  PATIENT STATED HISTORY: (As transcribed by Technologist)  Wilner states he was bit in the right hand last night by his wife's dog. Patient has pain, redness and swelling to right posterior hand over distal 2nd and 3rd metacarpals.    FINDINGS:  No acute osseous injury/fractures.  There is mild soft tissue swelling and subtle foci of soft tissue air along the medial aspect of the 2nd MCP joint.  No radiopaque foreign bodies.  Joint spaces are preserved.            CONCLUSION:  1. Soft tissue swelling and subtle foci of soft tissue air along the medial aspect of the 2nd MCP joint. 2. No osseous abnormalities.    LOCATION:  [facility]   Dictated by (CST): Lara Urias DO on 6/30/2024 at 10:18 AM     Finalized by (CST): Lara Urias DO on 6/30/2024 at 10:19 AM          IV was established patient was given IV Unasyn, patient did find his tetanus was updated 2 years ago.  We did not update patient's tetanus.     Large bulky dressing was applied         MDM   Pertinent Labs & Imaging studies reviewed. (See chart for details)  Differential diagnosis considered but not limited to: Puncture wound, cellulitis, dog bite, abrasion, laceration, hand fracture, foreign body  Patient coming in with dog bite to the hand. Patient provided with IV medication..  Radiology see above. Will treat for possible dog bite of the hand with cellulitis. Will discharge on Augmentin orthopedic follow-up. Patient is comfortable with this plan.  Overall Pt looks good. Non-toxic, well-hydrated and in no respiratory distress. Vital signs are reassuring. Exam is reassuring. I do not believe pt  requires and additional  diagnostic studiesor intervention. I believe pt  can be discharged home to continue evaluation as an outpatient. Follow-up provider given. Discharge instructions given and reviewed. Return for any problems. All understand and agreewith the plan.    Please note that this report has been produced using speech recognition software and may contain errors related to that system including, but not limited to, errors in grammar, punctuation, and spelling, as well as words and phrases that possibly may have been recognized inappropriately.  If there are any questions or concerns, contact the dictating provider for clarification.    Note to patient: The 21st Century Cures Act makes medical notes like these available to patients in the interest of transparency. However, this is a medical document intended as peer to peer communication. It is written in medical language and may contain abbreviations or verbiage that are unfamiliar. It may appear blunt or direct. Medical documents are intended to carry relevant information, facts as evident, and the clinical opinion of the practitioner.                                     Medical Decision Making      Disposition and Plan     Clinical Impression:  1. Dog bite of right hand, initial encounter    2. Cellulitis of right upper extremity         Disposition:  Discharge  6/30/2024 10:30 am    Follow-up:  Tad Bello, FNP-C  [address] [facility] [id number] [phone]          Wilfredo Mosqueda MD  [address] [place] [id number] [phone]                Medications Prescribed:  Current Discharge Medication List        START taking these medications    Details   amoxicillin clavulanate 875-125 MG Oral Tab Take 1 tablet by mouth 2 (two) times daily for 10 days.  Qty: 20 tablet, Refills: 0

## 2024-06-30 NOTE — ED QUICK NOTES
Pt states his spouse advised him he had his tetanus in 2022. Pt declined update at this time again.provider notified and noted that tetanus is not needed if last tetanus was in 2022.

## 2024-06-30 NOTE — DISCHARGE INSTRUCTIONS
Follow-up with your primary care provider for all of your healthcare needs  If you have increasing swelling or skin redness unable to move the hand please follow-up with orthopedics or go to the emergency room right away  Finish the full course of the Augmentin for 10 days  Tylenol and Motrin for pain  Ice to the hand ice 20 minutes on every couple hours  Keep the wounds clean and dry apply topical Neosporin or bacitracin  Return to the emergency room for symptoms or concerns

## 2024-07-01 ENCOUNTER — HOSPITAL ENCOUNTER (OUTPATIENT)
Age: 44
Discharge: ANOTHER HOSPITAL | End: 2024-07-01
Payer: COMMERCIAL

## 2024-07-01 ENCOUNTER — HOSPITAL ENCOUNTER (INPATIENT)
Facility: HOSPITAL | Age: 44
LOS: 7 days | Discharge: HOME HEALTH | End: 2024-07-08
Attending: EMERGENCY MEDICINE | Admitting: HOSPITALIST
Payer: COMMERCIAL

## 2024-07-01 VITALS
HEART RATE: 83 BPM | DIASTOLIC BLOOD PRESSURE: 89 MMHG | SYSTOLIC BLOOD PRESSURE: 153 MMHG | OXYGEN SATURATION: 99 % | RESPIRATION RATE: 20 BRPM | TEMPERATURE: 98 F

## 2024-07-01 DIAGNOSIS — S61.451A DOG BITE, HAND, RIGHT, INITIAL ENCOUNTER: Primary | ICD-10-CM

## 2024-07-01 DIAGNOSIS — S61.451D ANIMAL BITE OF RIGHT HAND WITH INFECTION, SUBSEQUENT ENCOUNTER: Primary | ICD-10-CM

## 2024-07-01 DIAGNOSIS — S60.519A ABRASION OF HAND WITH INFECTION: ICD-10-CM

## 2024-07-01 DIAGNOSIS — L08.9 ABRASION OF HAND WITH INFECTION: ICD-10-CM

## 2024-07-01 DIAGNOSIS — L08.9 ANIMAL BITE OF RIGHT HAND WITH INFECTION, SUBSEQUENT ENCOUNTER: Primary | ICD-10-CM
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
## 2024-07-01 LAB
ALBUMIN SERPL-MCNC: 4.1 G/DL (ref 3.4–5)
ALBUMIN/GLOB SERPL: 1.1 {RATIO} (ref 1–2)
ALP LIVER SERPL-CCNC: 103 U/L
ALT SERPL-CCNC: 37 U/L
ANION GAP SERPL CALC-SCNC: 9 MMOL/L (ref 0–18)
AST SERPL-CCNC: 15 U/L (ref 15–37)
BASOPHILS # BLD AUTO: 0.04 X10(3) UL (ref 0–0.2)
BASOPHILS NFR BLD AUTO: 0.4 %
BILIRUB SERPL-MCNC: 0.8 MG/DL (ref 0.1–2)
BUN BLD-MCNC: 17 MG/DL (ref 9–23)
CALCIUM BLD-MCNC: 9.2 MG/DL (ref 8.5–10.1)
CHLORIDE SERPL-SCNC: 104 MMOL/L (ref 98–112)
CO2 SERPL-SCNC: 23 MMOL/L (ref 21–32)
CREAT BLD-MCNC: 0.89 MG/DL
EGFRCR SERPLBLD CKD-EPI 2021: 108 ML/MIN/1.73M2 (ref 60–?)
EOSINOPHIL # BLD AUTO: 0.23 X10(3) UL (ref 0–0.7)
EOSINOPHIL NFR BLD AUTO: 2.2 %
ERYTHROCYTE [DISTWIDTH] IN BLOOD BY AUTOMATED COUNT: 11.8 %
GLOBULIN PLAS-MCNC: 3.8 G/DL (ref 2.8–4.4)
GLUCOSE BLD-MCNC: 106 MG/DL (ref 70–99)
HCT VFR BLD AUTO: 51.1 %
HGB BLD-MCNC: 17.8 G/DL
IMM GRANULOCYTES # BLD AUTO: 0.02 X10(3) UL (ref 0–1)
IMM GRANULOCYTES NFR BLD: 0.2 %
LACTATE SERPL-SCNC: 0.7 MMOL/L (ref 0.4–2)
LYMPHOCYTES # BLD AUTO: 1.24 X10(3) UL (ref 1–4)
LYMPHOCYTES NFR BLD AUTO: 11.9 %
MCH RBC QN AUTO: 30.3 PG (ref 26–34)
MCHC RBC AUTO-ENTMCNC: 34.8 G/DL (ref 31–37)
MCV RBC AUTO: 87.1 FL
MONOCYTES # BLD AUTO: 0.7 X10(3) UL (ref 0.1–1)
MONOCYTES NFR BLD AUTO: 6.7 %
NEUTROPHILS # BLD AUTO: 8.23 X10 (3) UL (ref 1.5–7.7)
NEUTROPHILS # BLD AUTO: 8.23 X10(3) UL (ref 1.5–7.7)
NEUTROPHILS NFR BLD AUTO: 78.6 %
OSMOLALITY SERPL CALC.SUM OF ELEC: 284 MOSM/KG (ref 275–295)
PLATELET # BLD AUTO: 277 10(3)UL (ref 150–450)
POTASSIUM SERPL-SCNC: 4.2 MMOL/L (ref 3.5–5.1)
PROT SERPL-MCNC: 7.9 G/DL (ref 6.4–8.2)
RBC # BLD AUTO: 5.87 X10(6)UL
SODIUM SERPL-SCNC: 136 MMOL/L (ref 136–145)
WBC # BLD AUTO: 10.5 X10(3) UL (ref 4–11)

## 2024-07-01 PROCEDURE — 99215 OFFICE O/P EST HI 40 MIN: CPT | Performed by: NURSE PRACTITIONER

## 2024-07-01 PROCEDURE — 99223 1ST HOSP IP/OBS HIGH 75: CPT | Performed by: HOSPITALIST

## 2024-07-01 RX ORDER — DIPHENHYDRAMINE HYDROCHLORIDE 50 MG/ML
INJECTION INTRAMUSCULAR; INTRAVENOUS
Status: COMPLETED
Start: 2024-07-01 | End: 2024-07-01

## 2024-07-01 RX ORDER — MORPHINE SULFATE 2 MG/ML
2 INJECTION, SOLUTION INTRAMUSCULAR; INTRAVENOUS EVERY 2 HOUR PRN
Status: DISCONTINUED | OUTPATIENT
Start: 2024-07-01 | End: 2024-07-08

## 2024-07-01 RX ORDER — ENEMA 19; 7 G/133ML; G/133ML
1 ENEMA RECTAL ONCE AS NEEDED
Status: DISCONTINUED | OUTPATIENT
Start: 2024-07-01 | End: 2024-07-08

## 2024-07-01 RX ORDER — ALPRAZOLAM 0.5 MG/1
0.5 TABLET ORAL 2 TIMES DAILY PRN
Status: DISCONTINUED | OUTPATIENT
Start: 2024-07-01 | End: 2024-07-08

## 2024-07-01 RX ORDER — PROCHLORPERAZINE EDISYLATE 5 MG/ML
5 INJECTION INTRAMUSCULAR; INTRAVENOUS EVERY 8 HOURS PRN
Status: DISCONTINUED | OUTPATIENT
Start: 2024-07-01 | End: 2024-07-08

## 2024-07-01 RX ORDER — POLYETHYLENE GLYCOL 3350 17 G/17G
17 POWDER, FOR SOLUTION ORAL DAILY PRN
Status: DISCONTINUED | OUTPATIENT
Start: 2024-07-01 | End: 2024-07-08

## 2024-07-01 RX ORDER — MORPHINE SULFATE 2 MG/ML
1 INJECTION, SOLUTION INTRAMUSCULAR; INTRAVENOUS EVERY 2 HOUR PRN
Status: DISCONTINUED | OUTPATIENT
Start: 2024-07-01 | End: 2024-07-08

## 2024-07-01 RX ORDER — KETOROLAC TROMETHAMINE 15 MG/ML
15 INJECTION, SOLUTION INTRAMUSCULAR; INTRAVENOUS ONCE
Status: COMPLETED | OUTPATIENT
Start: 2024-07-01 | End: 2024-07-01

## 2024-07-01 RX ORDER — ACETAMINOPHEN 325 MG/1
650 TABLET ORAL EVERY 4 HOURS PRN
Status: DISCONTINUED | OUTPATIENT
Start: 2024-07-01 | End: 2024-07-08

## 2024-07-01 RX ORDER — BISACODYL 10 MG
10 SUPPOSITORY, RECTAL RECTAL
Status: DISCONTINUED | OUTPATIENT
Start: 2024-07-01 | End: 2024-07-08

## 2024-07-01 RX ORDER — HYDROCODONE BITARTRATE AND ACETAMINOPHEN 5; 325 MG/1; MG/1
1 TABLET ORAL EVERY 4 HOURS PRN
Status: DISCONTINUED | OUTPATIENT
Start: 2024-07-01 | End: 2024-07-08

## 2024-07-01 RX ORDER — VANCOMYCIN HYDROCHLORIDE
15 ONCE
Status: COMPLETED | OUTPATIENT
Start: 2024-07-01 | End: 2024-07-01

## 2024-07-01 RX ORDER — ENOXAPARIN SODIUM 100 MG/ML
40 INJECTION SUBCUTANEOUS DAILY
Status: DISCONTINUED | OUTPATIENT
Start: 2024-07-01 | End: 2024-07-08

## 2024-07-01 RX ORDER — DIPHENHYDRAMINE HYDROCHLORIDE 50 MG/ML
25 INJECTION INTRAMUSCULAR; INTRAVENOUS EVERY 6 HOURS PRN
Status: DISCONTINUED | OUTPATIENT
Start: 2024-07-01 | End: 2024-07-08

## 2024-07-01 RX ORDER — MELATONIN
3 NIGHTLY PRN
Status: DISCONTINUED | OUTPATIENT
Start: 2024-07-01 | End: 2024-07-08

## 2024-07-01 RX ORDER — ACETAMINOPHEN 500 MG
500 TABLET ORAL EVERY 4 HOURS PRN
Status: DISCONTINUED | OUTPATIENT
Start: 2024-07-01 | End: 2024-07-08

## 2024-07-01 RX ORDER — MORPHINE SULFATE 4 MG/ML
4 INJECTION, SOLUTION INTRAMUSCULAR; INTRAVENOUS EVERY 2 HOUR PRN
Status: DISCONTINUED | OUTPATIENT
Start: 2024-07-01 | End: 2024-07-08

## 2024-07-01 RX ORDER — HYDROCODONE BITARTRATE AND ACETAMINOPHEN 5; 325 MG/1; MG/1
2 TABLET ORAL EVERY 4 HOURS PRN
Status: DISCONTINUED | OUTPATIENT
Start: 2024-07-01 | End: 2024-07-08

## 2024-07-01 RX ORDER — SENNOSIDES 8.6 MG
17.2 TABLET ORAL NIGHTLY PRN
Status: DISCONTINUED | OUTPATIENT
Start: 2024-07-01 | End: 2024-07-08

## 2024-07-01 RX ORDER — KETOROLAC TROMETHAMINE 15 MG/ML
15 INJECTION, SOLUTION INTRAMUSCULAR; INTRAVENOUS EVERY 6 HOURS PRN
Status: DISPENSED | OUTPATIENT
Start: 2024-07-01 | End: 2024-07-03

## 2024-07-01 RX ORDER — ONDANSETRON 2 MG/ML
4 INJECTION INTRAMUSCULAR; INTRAVENOUS EVERY 6 HOURS PRN
Status: DISCONTINUED | OUTPATIENT
Start: 2024-07-01 | End: 2024-07-08

## 2024-07-01 NOTE — ED INITIAL ASSESSMENT (HPI)
Pt states he was bit by a dog Saturday. Dog is current on vaccines. Pt went to doctor on Sunday and started on oral and had IV antibiotic. Pt states today the swelling/pain worsened. Pt sent to ED from  for work up.

## 2024-07-01 NOTE — ED PROVIDER NOTES
Patient Seen in: [facility]      History     Chief Complaint   Patient presents with    Cellulitis     Stated Complaint: f/u dog bite    Subjective:   43-year-old male presents today with follow-up of a dog bite sustained to the right hand 2 days prior.  Was seen here yesterday was given dose of IV antibiotics and sent home with prescription for Augmentin which patient has taken.  States pain and swelling has become worse and states now cannot move his middle finger.  Pain is radiating up the forearm.  Reports worsening of swelling and redness.  Denies any fever or chills.  Alert orientated x 3.  No other symptoms or concerns.  The patient's medication list, past medical history and social history elements as listed in today's nurse's notes were reviewed and agreed (except as otherwise stated in the HPI).  The patient's family history reviewed and determined to be noncontributory to the presenting problem            Objective:   No pertinent past medical history.            No pertinent past surgical history.              No pertinent social history.            Review of Systems    Positive for stated Chief Complaint: Cellulitis    Other systems are as noted in HPI.  Constitutional and vital signs reviewed.      All other systems reviewed and negative except as noted above.    Physical Exam     ED Triage Vitals [07/01/24 0827]   /89   Pulse 83   Resp 20   Temp 98.1 °F (36.7 °C)   Temp src    SpO2 99 %   O2 Device None (Room air)       Current Vitals:   Vital Signs  BP: 153/89  Pulse: 83  Resp: 20  Temp: 98.1 °F (36.7 °C)    Oxygen Therapy  SpO2: 99 %  O2 Device: None (Room air)            Physical Exam  Vitals and nursing note reviewed.   Constitutional:       Appearance: Normal appearance.   HENT:      Head: Normocephalic.      Mouth/Throat:      Mouth: Mucous membranes are moist.   Cardiovascular:      Rate and Rhythm: Normal rate.   Pulmonary:      Effort: Pulmonary effort is normal.    Musculoskeletal:      Comments: Redness and swelling noted to the right hand specifically over the third metacarpal at the MCP.  Patient unable to move the middle finger due to pain and swelling.  Capillary refill less than 3 seconds.   Skin:     General: Skin is warm and dry.   Neurological:      Mental Status: He is alert and oriented to person, place, and time.               ED Course   Labs Reviewed - No data to display                   MDM     Please note that this report has been produced using speech recognition software and may contain errors related to that system including, but not limited to, errors in grammar, punctuation, and spelling, as well as words and phrases that possibly may have been recognized inappropriately.  If there are any questions or concerns, contact the dictating provider for clarification.        Note to patient: The 21st Century Cures Act makes medical notes like these available to patients in the interest of transparency. However, this is a medical document intended as peer to peer communication. It is written in medical language and may contain abbreviations or verbiage that are unfamiliar. It may appear blunt or direct. Medical documents are intended to carry relevant information, facts as evident, and the clinical opinion of the practitioner.                                   Medical Decision Making  Differential diagnosis includes but is not limited to: Cellulitis, tenosynovitis      Presents today with worsening redness swelling to the right hand after dog bite he received about 2 days ago.  Patient was given dose of IV antibiotics here as well as oral antibiotics.  Symptoms have worsened and not improved.  Now has decreased range of motion and function of the right hand.  Concern for possible tenosynovitis.  Explained to patient do feel he needs IV antibiotics possible admit to the hospital depending on MD evaluation.  Patient states will go to [facility]  and [place].  Encouraged to go directly or not to stop to eat or drink anything in route.  Patient verbalized understanding and agreed to plan of care.        Disposition and Plan     Clinical Impression:  1. Animal bite of right hand with infection, subsequent encounter         Disposition:  Ic to ed  7/1/2024  8:35 am    Follow-up:  No follow-up provider specified.        Medications Prescribed:  Current Discharge Medication List

## 2024-07-01 NOTE — H&P
[facility]     Moo Cantor Patient Status:  Emergency    10/8/1980 MRN [id number]   Location [facility] EMERGENCY DEPARTMENT Attending Patito Richards MD   Hosp Day # 0 PCP None Pcp     Chief Complaint: Dog bite, right and swelling    Subjective:    History of Present Illness:     Moo Cantor is a 43 year old male with no significant PMH presented to ED due to redness, swelling, and pain of right hand following being bit by dog on Saturday. Patient went to immediate care on Saturday, was given dose of IV Unasyn and sent home with Augmentin. Despite taking antibiotics, presenting symptoms have worsened. Patient reports pain with movement of fingers and wrist and inability to make fist. Patient did notice some purulent drainage from bite site which has since resolved. Denies fevers/chills. Denies CP/SOB/N/V.     History/Other:    Past Medical History:  History reviewed. No pertinent past medical history.  Past Surgical History:   Past Surgical History:   Procedure Laterality Date    Other surgical history        Family History:   Family History   Problem Relation Age of Onset    Heart Disease Maternal Grandfather     Cancer Neg     Stroke Neg      Social History:    reports that he has quit smoking. His smoking use included cigarettes. He has never been exposed to tobacco smoke. He has never used smokeless tobacco. He reports current alcohol use. He reports that he does not currently use drugs.     Allergies: No Known Allergies    Medications:    Current Facility-Administered Medications on File Prior to Encounter   Medication Dose Route Frequency Provider Last Rate Last Admin    [COMPLETED] ampicillin-sulbactam (Unasyn) 3 g in sodium chloride 0.9% 100mL IVPB-ADD  3 g Intravenous Once Bobby Espino APRN   Stopped at 24 1028    [COMPLETED] acetaminophen (Tylenol Extra Strength) tab 1,000 mg  1,000 mg Oral Once Bobby Espino APRN   1,000 mg at 24  1040     Current Outpatient Medications on File Prior to Encounter   Medication Sig Dispense Refill    amoxicillin clavulanate 875-125 MG Oral Tab Take 1 tablet by mouth 2 (two) times daily for 10 days. 20 tablet 0    doxycycline 100 MG Oral Cap Take 1 capsule (100 mg total) by mouth 2 (two) times daily.      Meloxicam 15 MG Oral Tab Take 1 tablet (15 mg total) by mouth daily. (Patient not taking: Reported on 2/14/2024)         Review of Systems:   A comprehensive review of systems was completed.    Pertinent positives and negatives noted in the HPI.    Objective:   Physical Exam:    BP (!) 151/104   Pulse 74   Temp 97 °F (36.1 °C)   Resp 20   Ht 188 cm (6' 2\")   Wt 190 lb (86.2 kg)   SpO2 100%   BMI 24.39 kg/m²   General: No acute distress, Alert  Respiratory: No rhonchi, no wheezes  Cardiovascular: S1, S2. Regular rate and rhythm  Abdomen: Soft, Non-tender, non-distended, positive bowel sounds  Neuro: No new focal deficits  Extremities: Dorsal aspect of right hand with focal area of induration and surrounding erythema. Incomplete active ROM right hand/wrist due to pain.     Results:    Labs:      Labs Last 24 Hours:    Recent Labs   Lab 07/01/24  1112   RBC 5.87*   HGB 17.8*   HCT 51.1   MCV 87.1   MCH 30.3   MCHC 34.8   RDW 11.8   NEPRELIM 8.23*   WBC 10.5   .0       Recent Labs   Lab 07/01/24  1112   *   BUN 17   CREATSERUM 0.89   EGFRCR 108   CA 9.2   ALB 4.1      K 4.2      CO2 23.0   ALKPHO 103   AST 15   ALT 37   BILT 0.8   TP 7.9       No results found for: \"PT\", \"INR\"    No results for input(s): \"TROP\", \"TROPHS\", \"CK\" in the last 168 hours.    No results for input(s): \"TROP\", \"PBNP\" in the last 168 hours.    No results for input(s): \"PCT\" in the last 168 hours.    Imaging: Imaging data reviewed in Epic.    Assessment & Plan:      #Right hand dog bite cellulitis with concern for tenosynovitis  -Empiric Unasyn  -Developed facial redness and itching with Vancomycin given in ED    -Hand surgery and ID eval     Plan of care discussed with patient and ED physician.     Pawan Eng DO    Supplementary Documentation:     The 21st Century Cures Act makes medical notes like these available to patients in the interest of transparency. Please be advised this is a medical document. Medical documents are intended to carry relevant information, facts as evident, and the clinical opinion of the practitioner. The medical note is intended as peer to peer communication and may appear blunt or direct. It is written in medical language and may contain abbreviations or verbiage that are unfamiliar.

## 2024-07-01 NOTE — PLAN OF CARE
Patient is alert and oriented. On room air. Right hand with swelling and redness noted. No drainage noted. Patient denies numbness and tingling to the right hand. Patient declined pain medication. Patient endorses stiffness to the right hand. Receiving IV unasyn. Patient states anxiety. Prn xanax given. Ortho to see patient tomorrow. POC updated with patient. Patient verbalized understanding.

## 2024-07-01 NOTE — CONSULTS
[facility]   part of [facility] ID CONSULT NOTE    Moo Cantor Patient Status:  Observation    10/8/1980 MRN [id number]   Location [facility] 3NW-A Attending Pawan Eng DO   Hosp Day # 0 PCP None Pcp       Reason for Consultation:  Dog bite cellulitis, possible tenosynovitis    History of Present Illness:  Moo Cantor is a 43 year old male who presents for evaluation of his R hand.     Patient reports a dog bite to his right hand from his wife's dog on . The following day he noticed swelling. He was seen at urgent care yesterday and given a dose of IV Unasyn and dc with po Augmentin. He reports persistent/worsening pain to his R hand, extending to the wrist. Now also with worsening erythema. He reports difficulty moving his middle finger. He also noted a small amount of yellow drainage from one of the puncture sites initially but nothing now. Denies any fevers or chills.     Afebrile. WBC wnl.    History:  History reviewed. No pertinent past medical history.  Past Surgical History:   Procedure Laterality Date    Other surgical history       Family History   Problem Relation Age of Onset    Heart Disease Maternal Grandfather     Cancer Neg     Stroke Neg       reports that he has quit smoking. His smoking use included cigarettes. He has never been exposed to tobacco smoke. He has never used smokeless tobacco. He reports current alcohol use. He reports that he does not currently use drugs.    Allergies:  No Known Allergies    Medications:    Current Facility-Administered Medications:     diphenhydrAMINE (Benadryl) 50 mg/mL  injection, , ,     melatonin tab 3 mg, 3 mg, Oral, Nightly PRN    enoxaparin (Lovenox) 40 MG/0.4ML SUBQ injection 40 mg, 40 mg, Subcutaneous, Daily    morphINE PF 2 MG/ML injection 1 mg, 1 mg, Intravenous, Q2H PRN **OR** morphINE PF 2 MG/ML injection 2 mg, 2 mg, Intravenous, Q2H PRN **OR** morphINE PF 4 MG/ML injection 4 mg, 4 mg, Intravenous, Q2H  PRN    acetaminophen (Tylenol) tab 650 mg, 650 mg, Oral, Q4H PRN **OR** HYDROcodone-acetaminophen (Norco) 5-325 MG per tab 1 tablet, 1 tablet, Oral, Q4H PRN **OR** HYDROcodone-acetaminophen (Norco) 5-325 MG per tab 2 tablet, 2 tablet, Oral, Q4H PRN    acetaminophen (Tylenol Extra Strength) tab 500 mg, 500 mg, Oral, Q4H PRN    ondansetron (Zofran) 4 MG/2ML injection 4 mg, 4 mg, Intravenous, Q6H PRN    prochlorperazine (Compazine) 10 MG/2ML injection 5 mg, 5 mg, Intravenous, Q8H PRN    polyethylene glycol (PEG 3350) (Miralax) 17 g oral packet 17 g, 17 g, Oral, Daily PRN    sennosides (Senokot) tab 17.2 mg, 17.2 mg, Oral, Nightly PRN    bisacodyl (Dulcolax) 10 MG rectal suppository 10 mg, 10 mg, Rectal, Daily PRN    fleet enema (Fleet) 7-19 GM/118ML rectal enema 133 mL, 1 enema, Rectal, Once PRN    ampicillin-sulbactam (Unasyn) 3 g in sodium chloride 0.9% 100mL IVPB-ADD, 3 g, Intravenous, Q6H    diphenhydrAMINE (Benadryl) 50 mg/mL  injection 25 mg, 25 mg, Intravenous, Q6H PRN    ketorolac (Toradol) 15 MG/ML injection 15 mg, 15 mg, Intravenous, Q6H PRN    Review of Systems:  CONSTITUTIONAL:  No weakness or fatigue.  HEENT:  Eyes:  No visual loss Ears, Nose, Throat:  No hearing loss  SKIN:  No rash or itching.  CARDIOVASCULAR:  No chest pain  RESPIRATORY:  No shortness of breath  GASTROINTESTINAL:  No abdominal pain  GENITOURINARY:  No Burning on urination.   NEUROLOGICAL:  No headache  MUSCULOSKELETAL:  + R hand pain  HEMATOLOGIC:  No bleeding.     Physical Exam:  Vital signs: Blood pressure (!) 157/92, pulse 66, temperature 97.9 °F (36.6 °C), temperature source Oral, resp. rate 14, height 188 cm (6' 2\"), weight 190 lb (86.2 kg), SpO2 100%.    General: Alert, oriented, NAD, on room jonnie.   HEENT: Moist mucous membranes.   Neck: No lymphadenopathy.  Supple.  Respiratory: Non-labored breathing.  Abdomen: Nondistended.   Musculoskeletal: R hand edema. Difficulty moving fingers- mainly 3rd digit.    Integument: Erythema to R  hand with puncture sites- no drainage noted. Tender to palpation. See picture below.          Laboratory Data:  Recent Labs   Lab 07/01/24  1112   RBC 5.87*   HGB 17.8*   HCT 51.1   MCV 87.1   MCH 30.3   MCHC 34.8   RDW 11.8   NEPRELIM 8.23*   WBC 10.5   .0     Recent Labs   Lab 07/01/24  1112   *   BUN 17   CREATSERUM 0.89   CA 9.2   ALB 4.1      K 4.2      CO2 23.0   ALKPHO 103   AST 15   ALT 37   BILT 0.8   TP 7.9       Microbiology: Reviewed in EMR    Radiology: Reviewed.    Narrative  PROCEDURE:  XR HAND (MIN 3 VIEWS), RIGHT (CPT=73130)     TECHNIQUE:  Three views of the right hand were obtained.     COMPARISON:  None.     INDICATIONS:  dog bite to the right hand with pain, redness and swelling over the 2nd and 3rd metacarpals.     PATIENT STATED HISTORY: (As transcribed by Technologist)  Wilner states he was bit in the right hand last night by his wife's dog. Patient has pain, redness and swelling to right posterior hand over distal 2nd and 3rd metacarpals.     FINDINGS:  No acute osseous injury/fractures.  There is mild soft tissue swelling and subtle foci of soft tissue air along the medial aspect of the 2nd MCP joint.  No radiopaque foreign bodies.  Joint spaces are preserved.    Impression  CONCLUSION:    1. Soft tissue swelling and subtle foci of soft tissue air along the medial aspect of the 2nd MCP joint.  2. No osseous abnormalities.       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 Aleman, patient and patient's wife.     Thank you for  allowing us to participate in the care of this patient. Please do not hesitate to call if you have any questions.   We will continue to follow with you and will make further recommendations based on his progress.    [facility]  [phone]  7/1/2024    ID ATTENDING ADDENDUM     Pt seen an examined independently on 7/1, this is a late entry. Chart reviewed. Agree with above. Note has been reviewed by me and modified as needed.  Exam and Impression/ Recs as noted above.  Will follow  D/w staff and with pt  More than 50% of clinical time and 100% of the clinical decision making performed by me.    Evonne Aleman MD

## 2024-07-01 NOTE — ED PROVIDER NOTES
Patient Seen in: [facility] Emergency Department      History     Chief Complaint   Patient presents with    Bite     Stated Complaint: DOG BITE INFECTION, WORSE AFTER IV AND ORAL ABX    Subjective:   HPI    Patient is intermediate care with concern for infection related to dog bite.  His dog bit him Saturday night.  He went to immediate care the next day was given a dose of Unasyn and sent home on Augmentin.  He went back today for reevaluation for worsening redness, pain and swelling.  He was sent here for further evaluation given progression of symptoms.    Patient is right-handed, paints cars for living    Objective:   History reviewed. No pertinent past medical history.           Past Surgical History:   Procedure Laterality Date    Other surgical history                  Social History     Socioeconomic History    Marital status:    Tobacco Use    Smoking status: Former     Current packs/day: 0.75     Types: Cigarettes     Passive exposure: Never    Smokeless tobacco: Never   Substance and Sexual Activity    Alcohol use: Yes     Comment: 1-2 times a week    Drug use: Not Currently     Social Determinants of Health      [facility]    Social Connections    [facility], [facility]    Housing Stability              Review of Systems    Positive for stated Chief Complaint: Bite    Other systems are as noted in HPI.  Constitutional and vital signs reviewed.      All other systems reviewed and negative except as noted above.    Physical Exam     ED Triage Vitals   BP 07/01/24 1110 (!) 151/104   Pulse 07/01/24 1110 78   Resp 07/01/24 1110 20   Temp 07/01/24 1114 97 °F (36.1 °C)   Temp src --    SpO2 07/01/24 1110 99 %   O2 Device 07/01/24 1115 None (Room air)       Current Vitals:   Vital Signs  BP: (!) 151/104  Pulse: 74  Resp: 20  Temp: 97 °F (36.1 °C)  MAP (mmHg): (!) 117    Oxygen  Therapy  SpO2: 100 %  O2 Device: None (Room air)            Physical Exam    Physical Exam   Constitutional: Awake, alert, well appearing  Head: Normocephalic and atraumatic.   Eyes: Conjunctivae are normal. Pupils are equal, round, and reactive to light.   Neck: Normal range of motion. Neck supple.   Cardiovascular: Normal rate, regular rhythm  Pulmonary/Chest: Normal effort.  No accessory muscle use.  No clubbing, no cyanosis.  Abdominal: Soft. Bowel sounds are normal.   Neurological: Pt is alert and oriented to person, place, and time. no cranial nerve deficits  Skin: Skin is warm and dry.      Right hand: There i are 2 puncture wounds near his third MCP, there is significant swelling and erythema around the joint, there is pain with any range of motion there.  No fluctuance no crepitus    No tenderness of the extensor tendon going down.  Wrist not involved.    ED Course     Labs Reviewed   COMP METABOLIC PANEL (14) - Abnormal; Notable for the following components:       Result Value    Glucose 106 (*)     All other components within normal limits   CBC W/ DIFFERENTIAL - Abnormal; Notable for the following components:    RBC 5.87 (*)     HGB 17.8 (*)     Neutrophil Absolute Prelim 8.23 (*)     Neutrophil Absolute 8.23 (*)     All other components within normal limits   LACTIC ACID, PLASMA - Normal   CBC WITH DIFFERENTIAL WITH PLATELET    Narrative:     The following orders were created for panel order CBC With Differential With Platelet.  Procedure                               Abnormality         Status                     ---------                               -----------         ------                     CBC W/ DIFFERENTIAL[[id number]]          Abnormal            Final result                 Please view results for these tests on the individual orders.   BLOOD CULTURE   BLOOD CULTURE             Blood work reviewed, acceptable    Medications   vancomycin (Vancocin) 1.25 g in sodium chloride 0.9% 250mL IVPB  premix (1,250 mg Intravenous New Bag 7/1/24 1216)   ampicillin-sulbactam (Unasyn) 3 g in sodium chloride 0.9% 100mL IVPB-ADD (0 g Intravenous Stopped 7/1/24 1208)              MDM          Differential diagnoses considered: Cellulitis, tenosynovitis, infected joint, phlegmon/abscess.    -Given progression of his swelling and the location on his dominant hand, I think it is better to treat this aggressively with admission for IV to biotics.  I think Unasyn is probably adequate however given progression we will add on vancomycin for the time being.    Patient will be admitted primarily to the [facility] hospitalist.    Dr. Mosqueda from Ortho made aware consult via [employer] text.        *Discussion of ongoing management of this patient's care included: Admitting physician, Ortho  *Comorbidities contributing to the complexity of decision making: n/a  *External charts reviewed: n/a  *Additional sources of history: n/a    Shared decision making was done by: patient, myself.    Admission disposition: 7/1/2024 12:14 PM                                        Medical Decision Making      Disposition and Plan     Clinical Impression:  1. Dog bite, hand, right, initial encounter         Disposition:  Admit  7/1/2024 12:14 pm    Follow-up:  No follow-up provider specified.        Medications Prescribed:  Current Discharge Medication List                            Hospital Problems       Present on Admission  Date Reviewed: 4/24/2023            ICD-10-CM Noted POA    * (Principal) Dog bite, hand, right, initial encounter S61.451A, W54.0XXA 7/1/2024 Unknown

## 2024-07-01 NOTE — ED INITIAL ASSESSMENT (HPI)
Pt was seen yesterday for a dog bite.  Pt was given oral and iv antibiotics.  Pt back today because pain and redness are worse   Him/He

## 2024-07-01 NOTE — PROGRESS NOTES
NURSING ADMISSION NOTE      Patient admitted via Cart from ER  Oriented to room.  Safety precautions initiated.  Bed in low position.  Call light in reach.

## 2024-07-01 NOTE — DISCHARGE INSTRUCTIONS
As we discussed do feel you need higher level care and that what we can provide her at the immediate care.  Go directly to the ER do not stop to eat or drink anything in route.

## 2024-07-01 NOTE — ED QUICK NOTES
Orders for admission, patient is aware of plan and ready to go upstairs. Any questions, please call ED RN Helen at extension [phone].     Patient Covid vaccination status: Unvaccinated     COVID Test Ordered in ED: None    COVID Suspicion at Admission: N/A    Running Infusions:      Mental Status/LOC at time of transport: AOx4    Other pertinent information:   CIWA score: N/A   NIH score:  N/A

## 2024-07-02 PROCEDURE — 99232 SBSQ HOSP IP/OBS MODERATE 35: CPT | Performed by: HOSPITALIST

## 2024-07-02 NOTE — CONSULTS
[facility] Consult Note    CC: Dog bite of right hand    HPI: This 43 year old male with redness and swelling and pain in the right hand after being bit by a dog on Saturday.  Patient went to the immediate care on Saturday and was given a dose of IV Unasyn and sent home on Augmentin.  Symptoms progressively worsened and had swelling and difficulty making a fist.  He returned to the urgent care and was admitted for IV antibiotics.  Drainage was noted from the puncture wound between the thumb and index finger.  No drainage been noted over the middle finger puncture wound.    History reviewed. No pertinent past medical history.  Past Surgical History:   Procedure Laterality Date    Other surgical history       No current outpatient medications on file.     Allergies   Allergen Reactions    Vancomycin FACE FLUSHING     Redness and itching on scalp,face ,neck and upper chest area     Family History   Problem Relation Age of Onset    Heart Disease Maternal Grandfather     Cancer Neg     Stroke Neg      Social History     Occupational History    Not on file   Tobacco Use    Smoking status: Former     Current packs/day: 0.75     Types: Cigarettes     Passive exposure: Never    Smokeless tobacco: Never   Substance and Sexual Activity    Alcohol use: Yes     Comment: 1-2 times a week    Drug use: Not Currently    Sexual activity: Not on file        ROS:  12pt system review obtained and negative except as mentioned above      Physical Exam:    /76 (BP Location: Left arm)   Pulse 80   Temp 98.1 °F (36.7 °C) (Oral)   Resp 18   Ht 6' 2\" (1.88 m)   Wt 190 lb (86.2 kg)   SpO2 100%   BMI 24.39 kg/m²   Constitutional: Alert oriented x 3.  With distress.  Psychological: Normal judgment.  Normal affect.  Respiratory: Nonlabored respiration  Cardiac: Regular rate  Right upper extremity:  Inspection: skin intact with erythema.  Erythema focally around the middle finger dorsal MCP P area puncture wound.  There is a puncture  wound in the thumb index finger webspace with minimal erythema  Palpation: Tenderness most focally around the dorsal aspect of the middle finger over the MCP tenderness to mild degrees surrounding it to the dorsal ulnar aspect of the hand.  Passive range of motion of right middle finger MCP joint resulted in minimal pain  Range of motion: EDC tendons appear to be intact.  One third arc of motion  Neuromuscular: Sensation is intact light touch in the median, ulnar, radial sensory distribution.  Motor functions intact.  PIN, ulnar motor nerve.  Vascular: 2+ radial pulse  Lymph: No lymphatic streaking      Imaging: I personally viewed, independently interpreted and radiology report read.  X-ray right hand 3 views, no fractures dislocations or osseous abnormalities.  Soft tissue swelling      Labs:    Recent Results (from the past 72 hour(s))   Comp Metabolic Panel (14)    Collection Time: 07/01/24 11:12 AM   Result Value Ref Range    Glucose 106 (H) 70 - 99 mg/dL    Sodium 136 136 - 145 mmol/L    Potassium 4.2 3.5 - 5.1 mmol/L    Chloride 104 98 - 112 mmol/L    CO2 23.0 21.0 - 32.0 mmol/L    Anion Gap 9 0 - 18 mmol/L    BUN 17 9 - 23 mg/dL    Creatinine 0.89 0.70 - 1.30 mg/dL    Calcium, Total 9.2 8.5 - 10.1 mg/dL    Calculated Osmolality 284 275 - 295 mOsm/kg    eGFR-Cr 108 >=60 mL/min/1.73m2    AST 15 15 - 37 U/L    ALT 37 16 - 61 U/L    Alkaline Phosphatase 103 45 - 117 U/L    Bilirubin, Total 0.8 0.1 - 2.0 mg/dL    Total Protein 7.9 6.4 - 8.2 g/dL    Albumin 4.1 3.4 - 5.0 g/dL    Globulin  3.8 2.8 - 4.4 g/dL    A/G Ratio 1.1 1.0 - 2.0   Lactic Acid, Plasma    Collection Time: 07/01/24 11:12 AM   Result Value Ref Range    Lactic Acid 0.7 0.4 - 2.0 mmol/L   CBC W/ DIFFERENTIAL    Collection Time: 07/01/24 11:12 AM   Result Value Ref Range    WBC 10.5 4.0 - 11.0 x10(3) uL    RBC 5.87 (H) 4.30 - 5.70 x10(6)uL    HGB 17.8 (H) 13.0 - 17.5 g/dL    HCT 51.1 39.0 - 53.0 %    .0 150.0 - 450.0 10(3)uL    MCV 87.1 80.0  - 100.0 fL    MCH 30.3 26.0 - 34.0 pg    MCHC 34.8 31.0 - 37.0 g/dL    RDW 11.8 %    Neutrophil Absolute Prelim 8.23 (H) 1.50 - 7.70 x10 (3) uL    Neutrophil Absolute 8.23 (H) 1.50 - 7.70 x10(3) uL    Lymphocyte Absolute 1.24 1.00 - 4.00 x10(3) uL    Monocyte Absolute 0.70 0.10 - 1.00 x10(3) uL    Eosinophil Absolute 0.23 0.00 - 0.70 x10(3) uL    Basophil Absolute 0.04 0.00 - 0.20 x10(3) uL    Immature Granulocyte Absolute 0.02 0.00 - 1.00 x10(3) uL    Neutrophil % 78.6 %    Lymphocyte % 11.9 %    Monocyte % 6.7 %    Eosinophil % 2.2 %    Basophil % 0.4 %    Immature Granulocyte % 0.2 %   RAINBOW DRAW BLUE    Collection Time: 07/01/24 11:49 AM   Result Value Ref Range    Hold Blue Auto Resulted           Assessment/Plan:  43-year-old male with right dorsal hand cellulitis +/- reactive/septic extensor tendon tenosynovitis.  Low suspicion of right middle finger MCP joint infection.  We discussed proceeding with surgery versus giving a another 12 hours of IV antibiotics to see if there is any significant improvement.  If there is not significant improvement recommend proceeding with formal surgical irrigation and debridement.  We discussed obtaining an MRI but at this point we will not significantly change our surgical management.  Patient to be kept n.p.o. after midnight tonight.  Hold anticoagulation.      Wilfredo Mosqueda MD  [facility]

## 2024-07-02 NOTE — PROGRESS NOTES
[facility]   part of [facility]     Hospitalist Progress Note     Moo Cantor Patient Status:  Inpatient    10/8/1980 MRN [id number]   Location [facility] 3NW-A Attending Pawan Eng DO   Hosp Day # 1 PCP None Pcp     Chief Complaint: Right hand redness/swelling    Subjective:     Patient seen and examined. Right hand redness/swelling feels about the same.     Objective:    Review of Systems:   A comprehensive review of systems was completed; pertinent positive and negatives stated in subjective.    Vital signs:  Temp:  [97.6 °F (36.4 °C)-98.1 °F (36.7 °C)] 98.1 °F (36.7 °C)  Pulse:  [64-80] 80  Resp:  [14-18] 18  BP: (126-157)/(56-92) 129/76  SpO2:  [94 %-100 %] 100 %    Physical Exam:    General: No acute distress  Respiratory: No wheezes, no rhonchi  Cardiovascular: S1, S2, regular rate and rhythm  Abdomen: Soft, Non-tender, non-distended, positive bowel sounds  Neuro: No new focal deficits.   Extremities: Dorsal right hand erythema/edema/tender to palpation most appreciated at middle finger MCP    Diagnostic Data:    Labs:  Recent Labs   Lab 24  1112   WBC 10.5   HGB 17.8*   MCV 87.1   .0       Recent Labs   Lab 24  1112   *   BUN 17   CREATSERUM 0.89   CA 9.2   ALB 4.1      K 4.2      CO2 23.0   ALKPHO 103   AST 15   ALT 37   BILT 0.8   TP 7.9       Estimated Creatinine Clearance: 123.1 mL/min (based on SCr of 0.89 mg/dL).    No results for input(s): \"TROP\", \"TROPHS\", \"CK\" in the last 168 hours.    No results for input(s): \"PTP\", \"INR\" in the last 168 hours.               Microbiology    No results found for this visit on 24.      Imaging: Reviewed in Epic.    Medications:    enoxaparin  40 mg Subcutaneous Daily    ampicillin-sulbactam  3 g Intravenous Q6H       Assessment & Plan:      #Right hand dog bite cellulitis with concern for tenosynovitis  -Empiric Unasyn per ID  -Hand surgery eval noted - possible OR tomorrow pending response to  antibiotics     Pawan Eng DO    Supplementary Documentation:     Quality:  DVT Mechanical Prophylaxis:     Early ambuation  DVT Pharmacologic Prophylaxis   Medication    enoxaparin (Lovenox) 40 MG/0.4ML SUBQ injection 40 mg                Code Status: Not on file  Cho: No urinary catheter in place  Cho Duration (in days):   Central line:    ASHLEY:     Discharge is dependent on: clinical progress  At this point Mr. Cantor is expected to be discharge to: home    The 21st Century Cures Act makes medical notes like these available to patients in the interest of transparency. Please be advised this is a medical document. Medical documents are intended to carry relevant information, facts as evident, and the clinical opinion of the practitioner. The medical note is intended as peer to peer communication and may appear blunt or direct. It is written in medical language and may contain abbreviations or verbiage that are unfamiliar.

## 2024-07-02 NOTE — PAYOR COMM NOTE
--------------  ADMISSION REVIEW     Payor: KURT GILMORE  Subscriber #:  [id number]  Authorization Number: [id number]    Admit date: 7/1/24  Admit time:  1:46 PM       REVIEW DOCUMENTATION:    Patient Seen in: [facility] Emergency Department      History     Chief Complaint   Patient presents with    Bite     Stated Complaint: DOG BITE INFECTION, WORSE AFTER IV AND ORAL ABX    Subjective:   HPI    Patient is intermediate care with concern for infection related to dog bite.  His dog bit him Saturday night.  He went to immediate care the next day was given a dose of Unasyn and sent home on Augmentin.  He went back today for reevaluation for worsening redness, pain and swelling.  He was sent here for further evaluation given progression of symptoms.    Patient is right-handed, paints cars for living    Objective:   History reviewed. No pertinent past medical history.    Physical Exam     ED Triage Vitals   BP 07/01/24 1110 (!) 151/104   Pulse 07/01/24 1110 78   Resp 07/01/24 1110 20   Temp 07/01/24 1114 97 °F (36.1 °C)   Temp src --    SpO2 07/01/24 1110 99 %   O2 Device 07/01/24 1115 None (Room air)       Current Vitals:   Vital Signs  BP: (!) 151/104  Pulse: 74  Resp: 20  Temp: 97 °F (36.1 °C)  MAP (mmHg): (!) 117    Oxygen Therapy  SpO2: 100 %  O2 Device: None (Room air)    Physical Exam    Physical Exam   Constitutional: Awake, alert, well appearing  Head: Normocephalic and atraumatic.   Eyes: Conjunctivae are normal. Pupils are equal, round, and reactive to light.   Neck: Normal range of motion. Neck supple.   Cardiovascular: Normal rate, regular rhythm  Pulmonary/Chest: Normal effort.  No accessory muscle use.  No clubbing, no cyanosis.  Abdominal: Soft. Bowel sounds are normal.   Neurological: Pt is alert and oriented to person, place, and time. no cranial nerve deficits  Skin: Skin is warm and dry.      Right hand: There i are 2 puncture wounds near his third MCP, there is significant swelling and erythema  around the joint, there is pain with any range of motion there.  No fluctuance no crepitus    ED Course     Labs Reviewed   COMP METABOLIC PANEL (14) - Abnormal; Notable for the following components:       Result Value    Glucose 106 (*)     All other components within normal limits   CBC W/ DIFFERENTIAL - Abnormal; Notable for the following components:    RBC 5.87 (*)     HGB 17.8 (*)     Neutrophil Absolute Prelim 8.23 (*)     Neutrophil Absolute 8.23 (*)     All other components within normal limits   LACTIC ACID, PLASMA - Normal   BLOOD CULTURE   BLOOD CULTURE     Medications   vancomycin (Vancocin) 1.25 g in sodium chloride 0.9% 250mL IVPB premix (1,250 mg Intravenous New Bag 7/1/24 1216)   ampicillin-sulbactam (Unasyn) 3 g in sodium chloride 0.9% 100mL IVPB-ADD (0 g Intravenous Stopped 7/1/24 1208)       -Given progression of his swelling and the location on his dominant hand, I think it is better to treat this aggressively with admission for IV to [facility].  I think Unasyn is probably adequate however given progression we will add on vancomycin for the time being.    Patient will be admitted primarily to the [facility] hospitalist.    Dr. Mosqueda from [facility] made aware consult via [employer] text.      Disposition and Plan     Clinical Impression:  1. Dog bite, hand, right, initial encounter         Disposition:  Admit  7/1/2024 12:14 pm      Signed by Patito Richards MD on 7/1/2024 12:24 PM             HISTORY AND PHYSICAL      Assessment & Plan:  #Right hand dog bite cellulitis with concern for tenosynovitis  -Empiric Unasyn  -Developed facial redness and itching with Vancomycin given in ED   -Hand surgery and ID eval         ID CONSULT  7-1-24     Patient reports a dog bite to his right hand from his wife's dog on 6/29. The following day he noticed swelling. He was seen at urgent care yesterday and given a dose of IV Unasyn and dc with po Augmentin. He reports persistent/worsening pain to his R hand, extending to  the wrist. Now also with worsening erythema. He reports difficulty moving his middle finger. He also noted a small amount of yellow drainage from one of the puncture sites initially but nothing now. Denies any fevers or chills.     Musculoskeletal: R hand edema. Difficulty moving fingers- mainly 3rd digit.    Integument: Erythema to R hand with puncture sites- no drainage noted. Tender to palpation    PROCEDURE: XR HAND (MIN 3 VIEWS), RIGHT   CONCLUSION:    1. Soft tissue swelling and subtle foci of soft tissue air along the medial aspect of the 2nd MCP joint.  2. No osseous abnormalities    PLAN:  - continue IV Unasyn  - follow blood cultures  - ortho to see  - follow temp  - follow area clinically        Orthopedics 7-2-24    43 year old male with redness and swelling and pain in the right hand after being bit by a dog on Saturday.  Patient went to the immediate care on Saturday and was given a dose of IV Unasyn and sent home on Augmentin.  Symptoms progressively worsened and had swelling and difficulty making a fist.  He returned to the urgent care and was admitted for IV antibiotics.  Drainage was noted from the puncture wound between the thumb and index finger.  No drainage been noted over the middle finger puncture wound.     Right upper extremity:  Inspection: skin intact with erythema.  Erythema focally around the middle finger dorsal MCP P area puncture wound.  There is a puncture wound in the thumb index finger webspace with minimal erythema  Palpation: Tenderness most focally around the dorsal aspect of the middle finger over the MCP tenderness to mild degrees surrounding it to the dorsal ulnar aspect of the hand.  Passive range of motion of right middle finger MCP joint resulted in minimal pain  Range of motion: EDC tendons appear to be intact.  One third arc of motion  Neuromuscular: Sensation is intact light touch in the median, ulnar, radial sensory distribution.  Motor functions intact.  PIN, ulnar  motor nerve.  Vascular: 2+ radial pulse  Lymph: No lymphatic streaking     Assessment/Plan:  43-year-old male with right dorsal hand cellulitis +/- reactive/septic extensor tendon tenosynovitis.  Low suspicion of right middle finger MCP joint infection.  We discussed proceeding with surgery versus giving a another 12 hours of IV antibiotics to see if there is any significant improvement.  If there is not significant improvement recommend proceeding with formal surgical irrigation and debridement.  We discussed obtaining an MRI but at this point we will not significantly change our surgical management.  Patient to be kept n.p.o. after midnight tonight.  Hold anticoagulation.           MEDICATIONS ADMINISTERED IN LAST 1 DAY:  ALPRAZolam (Xanax) tab 0.5 mg       Date Action Dose Route User    7/2/2024 1311 Given 0.5 mg Oral Sophie Paulino RN    7/1/2024 1702 Given 0.5 mg Oral Kaila Davis RN          ampicillin-sulbactam (Unasyn) 3 g in sodium chloride 0.9% 100mL IVPB-ADD       Date Action Dose Route User    7/2/2024 1145 New Bag 3 g Intravenous Sophie Paulino RN    7/2/2024 0646 New Bag 3 g Intravenous Nell Collins RN    7/2/2024 0045 New Bag 3 g Intravenous Nell Collins RN    7/1/2024 1703 New Bag 3 g Intravenous Kaila Davis RN          diphenhydrAMINE (Benadryl) 50 mg/mL  injection       Date Action Dose Route User    7/1/2024 1400 Given (none) (none) Kaila Davis RN          enoxaparin (Lovenox) 40 MG/0.4ML SUBQ injection 40 mg       Date Action Dose Route User    7/1/2024 1433 Given 40 mg Subcutaneous (Left Lower Abdomen) Gretchen Alfaro RN          HYDROcodone-acetaminophen (Norco) 5-325 MG per tab 1 tablet       Date Action Dose Route User    7/1/2024 1800 Given 1 tablet Oral Kaila Davis RN          HYDROcodone-acetaminophen (Norco) 5-325 MG per tab 2 tablet       Date Action Dose Route User    7/2/2024 1021 Given 2 tablet Oral Sophie Paulino RN          ketorolac (Toradol) 15 MG/ML  injection 15 mg       Date Action Dose Route User    7/1/2024 2057 Given 15 mg Intravenous Nell Collins RN          melatonin tab 3 mg       Date Action Dose Route User    7/1/2024 2058 Given 3 mg Oral Nell Collins RN          morphINE PF 2 MG/ML injection 2 mg       Date Action Dose Route User    7/2/2024 0832 Given 2 mg Intravenous Sophie Paulino RN    7/1/2024 2057 Given 2 mg Intravenous Nell Collins RN            Vitals (last day)       Date/Time Temp Pulse Resp BP SpO2 Weight O2 Device O2 Flow Rate (L/min) [facility]    07/02/24 1204 98.1 °F (36.7 °C) 80 18 129/76 100 % -- None (Room air) --     07/02/24 1200 -- -- -- -- -- 190 lb (86.2 kg) -- --     07/02/24 0543 97.6 °F (36.4 °C) 64 16 126/56 99 % -- None (Room air) --     07/01/24 2022 97.7 °F (36.5 °C) 66 16 129/75 94 % -- None (Room air) --     07/01/24 1401 97.9 °F (36.6 °C) 66 14 157/92 100 % -- -- --     07/01/24 1245 -- 72 14 133/94 100 % -- None (Room air) --     07/01/24 1230 -- 78 15 -- 100 % -- None (Room air) --     07/01/24 1130 -- 74 -- -- 100 % -- None (Room air) --     07/01/24 1115 -- 76 -- 151/104 99 % -- None (Room air) --     07/01/24 1114 97 °F (36.1 °C) -- -- -- -- -- -- -- PD    07/01/24 1110 -- 78 20 151/104 99 % 190 lb (86.2 kg) -- -- PD

## 2024-07-02 NOTE — PROGRESS NOTES
Patient alert x 4, room air, denies chest pain and shortness of breath. Right hand redness, pain and swelling. Denies numbness or tingling. No drainage. C/o stiffness in fingers/hand. Tolerating general diet, denies nausea. Pain managed with PRNs per MAR. Ambulating ad dixon. IV antibiotics infusing as ordered. Plan of care discussed with patient, all questions addressed. Call light within reach. Ortho to see in am.

## 2024-07-02 NOTE — PROGRESS NOTES
[facility]   part of [facility] ID PROGRESS NOTE    Moo Cantor Patient Status:  Observation    10/8/1980 MRN [id number]   Location [facility] 3NW-A Attending Pawan Eng DO   Hosp Day # 0 PCP None Pcp     Abx: IV Unasyn D#1, s/p IV vanco    Subjective: Patient seen and examined today. Reports pain to his right hand is about the same as the previous day. Notes more swelling distally. Afebrile. On room air.     Allergies:  Allergies   Allergen Reactions    Vancomycin FACE FLUSHING     Redness and itching on scalp,face ,neck and upper chest area       Medications:    Current Facility-Administered Medications:     melatonin tab 3 mg, 3 mg, Oral, Nightly PRN    enoxaparin (Lovenox) 40 MG/0.4ML SUBQ injection 40 mg, 40 mg, Subcutaneous, Daily    morphINE PF 2 MG/ML injection 1 mg, 1 mg, Intravenous, Q2H PRN **OR** morphINE PF 2 MG/ML injection 2 mg, 2 mg, Intravenous, Q2H PRN **OR** morphINE PF 4 MG/ML injection 4 mg, 4 mg, Intravenous, Q2H PRN    acetaminophen (Tylenol) tab 650 mg, 650 mg, Oral, Q4H PRN **OR** HYDROcodone-acetaminophen (Norco) 5-325 MG per tab 1 tablet, 1 tablet, Oral, Q4H PRN **OR** HYDROcodone-acetaminophen (Norco) 5-325 MG per tab 2 tablet, 2 tablet, Oral, Q4H PRN    acetaminophen (Tylenol Extra Strength) tab 500 mg, 500 mg, Oral, Q4H PRN    ondansetron (Zofran) 4 MG/2ML injection 4 mg, 4 mg, Intravenous, Q6H PRN    prochlorperazine (Compazine) 10 MG/2ML injection 5 mg, 5 mg, Intravenous, Q8H PRN    polyethylene glycol (PEG 3350) (Miralax) 17 g oral packet 17 g, 17 g, Oral, Daily PRN    sennosides (Senokot) tab 17.2 mg, 17.2 mg, Oral, Nightly PRN    bisacodyl (Dulcolax) 10 MG rectal suppository 10 mg, 10 mg, Rectal, Daily PRN    fleet enema (Fleet) 7-19 GM/118ML rectal enema 133 mL, 1 enema, Rectal, Once PRN    ampicillin-sulbactam (Unasyn) 3 g in sodium chloride 0.9% 100mL IVPB-ADD, 3 g, Intravenous, Q6H    diphenhydrAMINE (Benadryl) 50 mg/mL  injection 25 mg, 25  mg, Intravenous, Q6H PRN    ketorolac (Toradol) 15 MG/ML injection 15 mg, 15 mg, Intravenous, Q6H PRN    ALPRAZolam (Xanax) tab 0.5 mg, 0.5 mg, Oral, BID PRN    Review of Systems:  Completed. See pertinent positives and negatives above.     Physical Exam:  Vital signs: Blood pressure 126/56, pulse 64, temperature 97.6 °F (36.4 °C), temperature source Oral, resp. rate 16, height 188 cm (6' 2\"), weight 190 lb (86.2 kg), SpO2 99%.    General: Alert, oriented, NAD, on room jonnie.   HEENT: Moist mucous membranes.   Neck: No lymphadenopathy.  Supple.  Respiratory: Non-labored breathing.  Abdomen: Nondistended.   Musculoskeletal: R hand edema. Difficulty moving fingers- mainly 3rd digit.    Integument: Erythema to R hand with puncture sites (dorsal aspect at 3rd MCP and between thumb/2nd digit)- no drainage noted. Very tender to palpation. See picture below.          Laboratory Data:  Recent Labs   Lab 07/01/24  1112   RBC 5.87*   HGB 17.8*   HCT 51.1   MCV 87.1   MCH 30.3   MCHC 34.8   RDW 11.8   NEPRELIM 8.23*   WBC 10.5   .0     Recent Labs   Lab 07/01/24  1112   *   BUN 17   CREATSERUM 0.89   CA 9.2   ALB 4.1      K 4.2      CO2 23.0   ALKPHO 103   AST 15   ALT 37   BILT 0.8   TP 7.9       Microbiology: Reviewed in EMR    Radiology: Reviewed.    Narrative  PROCEDURE:  XR HAND (MIN 3 VIEWS), RIGHT (CPT=73130)     TECHNIQUE:  Three views of the right hand were obtained.     COMPARISON:  None.     INDICATIONS:  dog bite to the right hand with pain, redness and swelling over the 2nd and 3rd metacarpals.     PATIENT STATED HISTORY: (As transcribed by Technologist)  Wilner states he was bit in the right hand last night by his wife's dog. Patient has pain, redness and swelling to right posterior hand over distal 2nd and 3rd metacarpals.     FINDINGS:  No acute osseous injury/fractures.  There is mild soft tissue swelling and subtle foci of soft tissue air along the medial aspect of the 2nd MCP joint.   No radiopaque foreign bodies.  Joint spaces are preserved.    Impression  CONCLUSION:    1. Soft tissue swelling and subtle foci of soft tissue air along the medial aspect of the 2nd MCP joint.  2. No osseous abnormalities.       LOCATION:  [facility]    Dictated by (CST): Lara Urias DO on 6/30/2024 at 10:18 AM      Finalized by (CST): Lara Urias DO on 6/30/2024 at 10:19 AM     ASSESSMENT:  Right hand cellulitis, s/p dog bite   Concern for tenosynovitis and joint involvement.   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 Chart reviewed. Agree with above. Note has been reviewed by me and modified as needed.  Exam and Impression/ Recs as noted above.  Will follow  D/w staff and with pt  More than 50% of clinical time and 100% of the clinical decision making performed by me.    Evonne Aleman MD

## 2024-07-02 NOTE — PROGRESS NOTES
A&Ox4. VSS. RA.   GI: Abdomen soft, nondistended. Passing gas.  Denies nausea.  : Voids.  Pain controlled with PRN pain medications  Up ad dixon  Wounds: R hand red/swollen with 2 scabs. Limited motion of fingers  Diet: Regular, NPO at 0000  IVF running per order. IV unasyn  All appropriate safety measures in place. Patient updated on plan of care. All questions and concerns addressed.

## 2024-07-03 ENCOUNTER — ANESTHESIA EVENT (OUTPATIENT)
Dept: SURGERY | Facility: HOSPITAL | Age: 44
End: 2024-07-03
Payer: COMMERCIAL

## 2024-07-03 ENCOUNTER — ANESTHESIA (OUTPATIENT)
Dept: SURGERY | Facility: HOSPITAL | Age: 44
End: 2024-07-03
Payer: COMMERCIAL

## 2024-07-03 DIAGNOSIS — L08.9 ABRASION OF HAND WITH INFECTION: Primary | ICD-10-CM

## 2024-07-03 DIAGNOSIS — S60.519A ABRASION OF HAND WITH INFECTION: Primary | ICD-10-CM

## 2024-07-03 PROCEDURE — 0RBU0ZZ EXCISION OF RIGHT METACARPOPHALANGEAL JOINT, OPEN APPROACH: ICD-10-PCS | Performed by: ORTHOPAEDIC SURGERY

## 2024-07-03 PROCEDURE — 3074F SYST BP LT 130 MM HG: CPT | Performed by: HOSPITALIST

## 2024-07-03 PROCEDURE — 99232 SBSQ HOSP IP/OBS MODERATE 35: CPT | Performed by: HOSPITALIST

## 2024-07-03 PROCEDURE — 3078F DIAST BP <80 MM HG: CPT | Performed by: HOSPITALIST

## 2024-07-03 PROCEDURE — 3008F BODY MASS INDEX DOCD: CPT | Performed by: HOSPITALIST

## 2024-07-03 RX ORDER — KETOROLAC TROMETHAMINE 15 MG/ML
30 INJECTION, SOLUTION INTRAMUSCULAR; INTRAVENOUS EVERY 6 HOURS
Status: DISPENSED | OUTPATIENT
Start: 2024-07-03 | End: 2024-07-05

## 2024-07-03 RX ORDER — HYDROCODONE BITARTRATE AND ACETAMINOPHEN 5; 325 MG/1; MG/1
1 TABLET ORAL ONCE AS NEEDED
Status: DISCONTINUED | OUTPATIENT
Start: 2024-07-03 | End: 2024-07-03 | Stop reason: HOSPADM

## 2024-07-03 RX ORDER — PROCHLORPERAZINE EDISYLATE 5 MG/ML
5 INJECTION INTRAMUSCULAR; INTRAVENOUS EVERY 8 HOURS PRN
Status: DISCONTINUED | OUTPATIENT
Start: 2024-07-03 | End: 2024-07-03 | Stop reason: HOSPADM

## 2024-07-03 RX ORDER — DEXAMETHASONE SODIUM PHOSPHATE 4 MG/ML
VIAL (ML) INJECTION AS NEEDED
Status: DISCONTINUED | OUTPATIENT
Start: 2024-07-03 | End: 2024-07-03 | Stop reason: SURG

## 2024-07-03 RX ORDER — MIDAZOLAM HYDROCHLORIDE 1 MG/ML
INJECTION INTRAMUSCULAR; INTRAVENOUS AS NEEDED
Status: DISCONTINUED | OUTPATIENT
Start: 2024-07-03 | End: 2024-07-03 | Stop reason: SURG

## 2024-07-03 RX ORDER — BUPIVACAINE HYDROCHLORIDE 5 MG/ML
INJECTION, SOLUTION EPIDURAL; INTRACAUDAL AS NEEDED
Status: DISCONTINUED | OUTPATIENT
Start: 2024-07-03 | End: 2024-07-03 | Stop reason: HOSPADM

## 2024-07-03 RX ORDER — ONDANSETRON 2 MG/ML
INJECTION INTRAMUSCULAR; INTRAVENOUS AS NEEDED
Status: DISCONTINUED | OUTPATIENT
Start: 2024-07-03 | End: 2024-07-03 | Stop reason: SURG

## 2024-07-03 RX ORDER — MEPERIDINE HYDROCHLORIDE 25 MG/ML
12.5 INJECTION INTRAMUSCULAR; INTRAVENOUS; SUBCUTANEOUS AS NEEDED
Status: DISCONTINUED | OUTPATIENT
Start: 2024-07-03 | End: 2024-07-03 | Stop reason: HOSPADM

## 2024-07-03 RX ORDER — HYDROMORPHONE HYDROCHLORIDE 1 MG/ML
0.4 INJECTION, SOLUTION INTRAMUSCULAR; INTRAVENOUS; SUBCUTANEOUS EVERY 5 MIN PRN
Status: DISCONTINUED | OUTPATIENT
Start: 2024-07-03 | End: 2024-07-03 | Stop reason: HOSPADM

## 2024-07-03 RX ORDER — SODIUM CHLORIDE, SODIUM LACTATE, POTASSIUM CHLORIDE, CALCIUM CHLORIDE 600; 310; 30; 20 MG/100ML; MG/100ML; MG/100ML; MG/100ML
INJECTION, SOLUTION INTRAVENOUS CONTINUOUS PRN
Status: DISCONTINUED | OUTPATIENT
Start: 2024-07-03 | End: 2024-07-03 | Stop reason: SURG

## 2024-07-03 RX ORDER — HYDROMORPHONE HYDROCHLORIDE 1 MG/ML
0.2 INJECTION, SOLUTION INTRAMUSCULAR; INTRAVENOUS; SUBCUTANEOUS EVERY 5 MIN PRN
Status: DISCONTINUED | OUTPATIENT
Start: 2024-07-03 | End: 2024-07-03 | Stop reason: HOSPADM

## 2024-07-03 RX ORDER — ALBUTEROL SULFATE 2.5 MG/3ML
2.5 SOLUTION RESPIRATORY (INHALATION) AS NEEDED
Status: DISCONTINUED | OUTPATIENT
Start: 2024-07-03 | End: 2024-07-03 | Stop reason: HOSPADM

## 2024-07-03 RX ORDER — LABETALOL HYDROCHLORIDE 5 MG/ML
5 INJECTION, SOLUTION INTRAVENOUS EVERY 5 MIN PRN
Status: DISCONTINUED | OUTPATIENT
Start: 2024-07-03 | End: 2024-07-03 | Stop reason: HOSPADM

## 2024-07-03 RX ORDER — NALOXONE HYDROCHLORIDE 0.4 MG/ML
80 INJECTION, SOLUTION INTRAMUSCULAR; INTRAVENOUS; SUBCUTANEOUS AS NEEDED
Status: DISCONTINUED | OUTPATIENT
Start: 2024-07-03 | End: 2024-07-03 | Stop reason: HOSPADM

## 2024-07-03 RX ORDER — HYDROMORPHONE HYDROCHLORIDE 1 MG/ML
0.6 INJECTION, SOLUTION INTRAMUSCULAR; INTRAVENOUS; SUBCUTANEOUS EVERY 5 MIN PRN
Status: DISCONTINUED | OUTPATIENT
Start: 2024-07-03 | End: 2024-07-03 | Stop reason: HOSPADM

## 2024-07-03 RX ORDER — MIDAZOLAM HYDROCHLORIDE 1 MG/ML
1 INJECTION INTRAMUSCULAR; INTRAVENOUS EVERY 5 MIN PRN
Status: DISCONTINUED | OUTPATIENT
Start: 2024-07-03 | End: 2024-07-03 | Stop reason: HOSPADM

## 2024-07-03 RX ORDER — SODIUM CHLORIDE, SODIUM LACTATE, POTASSIUM CHLORIDE, CALCIUM CHLORIDE 600; 310; 30; 20 MG/100ML; MG/100ML; MG/100ML; MG/100ML
INJECTION, SOLUTION INTRAVENOUS CONTINUOUS
Status: DISCONTINUED | OUTPATIENT
Start: 2024-07-03 | End: 2024-07-03 | Stop reason: HOSPADM

## 2024-07-03 RX ORDER — ACETAMINOPHEN 500 MG
1000 TABLET ORAL ONCE AS NEEDED
Status: DISCONTINUED | OUTPATIENT
Start: 2024-07-03 | End: 2024-07-03 | Stop reason: HOSPADM

## 2024-07-03 RX ORDER — METOCLOPRAMIDE HYDROCHLORIDE 5 MG/ML
INJECTION INTRAMUSCULAR; INTRAVENOUS AS NEEDED
Status: DISCONTINUED | OUTPATIENT
Start: 2024-07-03 | End: 2024-07-03 | Stop reason: SURG

## 2024-07-03 RX ORDER — HYDROCODONE BITARTRATE AND ACETAMINOPHEN 5; 325 MG/1; MG/1
2 TABLET ORAL ONCE AS NEEDED
Status: DISCONTINUED | OUTPATIENT
Start: 2024-07-03 | End: 2024-07-03 | Stop reason: HOSPADM

## 2024-07-03 RX ORDER — ONDANSETRON 2 MG/ML
4 INJECTION INTRAMUSCULAR; INTRAVENOUS EVERY 6 HOURS PRN
Status: DISCONTINUED | OUTPATIENT
Start: 2024-07-03 | End: 2024-07-03 | Stop reason: HOSPADM

## 2024-07-03 RX ORDER — CEFAZOLIN SODIUM 1 G/3ML
INJECTION, POWDER, FOR SOLUTION INTRAMUSCULAR; INTRAVENOUS AS NEEDED
Status: DISCONTINUED | OUTPATIENT
Start: 2024-07-03 | End: 2024-07-03 | Stop reason: SURG

## 2024-07-03 RX ORDER — LIDOCAINE HYDROCHLORIDE 10 MG/ML
INJECTION, SOLUTION EPIDURAL; INFILTRATION; INTRACAUDAL; PERINEURAL AS NEEDED
Status: DISCONTINUED | OUTPATIENT
Start: 2024-07-03 | End: 2024-07-03 | Stop reason: SURG

## 2024-07-03 RX ADMIN — CEFAZOLIN SODIUM 2 G: 1 INJECTION, POWDER, FOR SOLUTION INTRAMUSCULAR; INTRAVENOUS at 21:00:00

## 2024-07-03 RX ADMIN — SODIUM CHLORIDE, SODIUM LACTATE, POTASSIUM CHLORIDE, CALCIUM CHLORIDE: 600; 310; 30; 20 INJECTION, SOLUTION INTRAVENOUS at 20:35:00

## 2024-07-03 RX ADMIN — METOCLOPRAMIDE HYDROCHLORIDE 10 MG: 5 INJECTION INTRAMUSCULAR; INTRAVENOUS at 20:40:00

## 2024-07-03 RX ADMIN — DEXAMETHASONE SODIUM PHOSPHATE 4 MG: 4 MG/ML VIAL (ML) INJECTION at 20:40:00

## 2024-07-03 RX ADMIN — ONDANSETRON 4 MG: 2 INJECTION INTRAMUSCULAR; INTRAVENOUS at 20:55:00

## 2024-07-03 RX ADMIN — LIDOCAINE HYDROCHLORIDE 100 MG: 10 INJECTION, SOLUTION EPIDURAL; INFILTRATION; INTRACAUDAL; PERINEURAL at 20:40:00

## 2024-07-03 RX ADMIN — MIDAZOLAM HYDROCHLORIDE 2 MG: 1 INJECTION INTRAMUSCULAR; INTRAVENOUS at 20:35:00

## 2024-07-03 NOTE — PAYOR COMM NOTE
--------------  CONTINUED STAY REVIEW    Payor: BCALDAIR GILMORE  Subscriber #:  [id number]  Authorization Number: [id number]    Admit date: 7/1/24  Admit time:  1:46 PM    REVIEW DOCUMENTATION:   7-3-24     Physical Exam:    General: No acute distress, pleasant   Respiratory: No wheezes, no rhonchi  Cardiovascular: S1, S2, regular rate and rhythm  Abdomen: Soft, Non-tender, non-distended, positive bowel sounds  Neuro: No new focal deficits.   Extremities: No edema.  Right hand with swelling, erythema, warmth, dec rom 2/2 pain, especially over 3rd digit, bite mamadou noted    Assessment & Plan:  #Right hand dog bite cellulitis with concern for tenosynovitis  Continue Unasyn  ID following  Hand surgery consult - may go for washout today  Pain control  F/u cultures                    MEDICATIONS ADMINISTERED IN LAST 1 DAY:  ampicillin-sulbactam (Unasyn) 3 g in sodium chloride 0.9% 100mL IVPB-ADD       Date Action Dose Route User    7/3/2024 1143 New Bag 3 g Intravenous Rey Perez RN    7/3/2024 0550 New Bag 3 g Intravenous rIma Arnold RN    7/3/2024 0006 New Bag 3 g Intravenous Irma Arnold RN    7/2/2024 1830 New Bag 3 g Intravenous Sophie Paulino RN          HYDROcodone-acetaminophen (Norco) 5-325 MG per tab 2 tablet       Date Action Dose Route User    7/3/2024 0721 Given 2 tablet Oral Irma Arnold RN    7/2/2024 2116 Given 2 tablet Oral Irma Arnold RN    7/2/2024 1610 Given 2 tablet Oral Sophie Paulino RN            Vitals (last day)       Date/Time Temp Pulse Resp BP SpO2 Weight O2 Device O2 Flow Rate (L/min) Who    07/03/24 1226 97.9 °F (36.6 °C) 74 16 153/95 100 % -- None (Room air) -- VM    07/03/24 0421 98 °F (36.7 °C) 61 18 108/64 96 % -- None (Room air) 0 L/min DI    07/02/24 2229 98 °F (36.7 °C) 65 18 132/70 98 % -- None (Room air) 0 L/min DI    07/02/24 1204 98.1 °F (36.7 °C) 80 18 129/76 100 % -- None (Room air) -- VM    07/02/24 1200 -- -- -- -- -- 190 lb (86.2 kg) -- -- AC     07/02/24 0543 97.6 °F (36.4 °C) 64 16 126/56 99 % -- None (Room air) -- MG

## 2024-07-03 NOTE — PLAN OF CARE
Pt vitals stable, A&O x4. Pt denied chest pain and shortness of breath. Stated having pain, PRN meds given. Right hand red, swollen, warm, and noted with 2 puncture sites; ice pack applied. Tolerating diet without nausea. To be NPO after midnight for procedure. Denied numbness/tingling in hand. Bed locked in low position, call light in reach, rounding provided.

## 2024-07-03 NOTE — PLAN OF CARE
Pt resting in bed with right hand elevated on a pillow. Right hand noted with erythema/edema with c/o stiffness in fingers. No drainage noted from 2 puncture sites. Pt reports that hand is a bit less painful and looks less swollen. Pt c/o pain to right hand at 5 and was given Norco at 0721. Pt will have breakfast then be NPO for possible sx after seen by Ortho MD later this afternoon. Lovenox on hold.

## 2024-07-03 NOTE — CM/SW NOTE
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  Waiting to see if pt will need IV ABX for home.

## 2024-07-03 NOTE — PROGRESS NOTES
[facility]   part of [facility] ID PROGRESS NOTE    Moo Cantor Patient Status:  Observation    10/8/1980 MRN [id number]   Location [facility] 3NW-A Attending Pawan Eng DO   Hosp Day # 2 PCP None Pcp     Abx: IV Unasyn D#2, s/p IV vanco    Subjective: Patient seen and examined today. Some improvement in pain to his hand. Afebrile. On room air.     Allergies:  Allergies   Allergen Reactions    Vancomycin FACE FLUSHING     Redness and itching on scalp,face ,neck and upper chest area       Medications:    Current Facility-Administered Medications:     melatonin tab 3 mg, 3 mg, Oral, Nightly PRN    enoxaparin (Lovenox) 40 MG/0.4ML SUBQ injection 40 mg, 40 mg, Subcutaneous, Daily    morphINE PF 2 MG/ML injection 1 mg, 1 mg, Intravenous, Q2H PRN **OR** morphINE PF 2 MG/ML injection 2 mg, 2 mg, Intravenous, Q2H PRN **OR** morphINE PF 4 MG/ML injection 4 mg, 4 mg, Intravenous, Q2H PRN    acetaminophen (Tylenol) tab 650 mg, 650 mg, Oral, Q4H PRN **OR** HYDROcodone-acetaminophen (Norco) 5-325 MG per tab 1 tablet, 1 tablet, Oral, Q4H PRN **OR** HYDROcodone-acetaminophen (Norco) 5-325 MG per tab 2 tablet, 2 tablet, Oral, Q4H PRN    acetaminophen (Tylenol Extra Strength) tab 500 mg, 500 mg, Oral, Q4H PRN    ondansetron (Zofran) 4 MG/2ML injection 4 mg, 4 mg, Intravenous, Q6H PRN    prochlorperazine (Compazine) 10 MG/2ML injection 5 mg, 5 mg, Intravenous, Q8H PRN    polyethylene glycol (PEG 3350) (Miralax) 17 g oral packet 17 g, 17 g, Oral, Daily PRN    sennosides (Senokot) tab 17.2 mg, 17.2 mg, Oral, Nightly PRN    bisacodyl (Dulcolax) 10 MG rectal suppository 10 mg, 10 mg, Rectal, Daily PRN    fleet enema (Fleet) 7-19 GM/118ML rectal enema 133 mL, 1 enema, Rectal, Once PRN    ampicillin-sulbactam (Unasyn) 3 g in sodium chloride 0.9% 100mL IVPB-ADD, 3 g, Intravenous, Q6H    diphenhydrAMINE (Benadryl) 50 mg/mL  injection 25 mg, 25 mg, Intravenous, Q6H PRN    ALPRAZolam (Xanax) tab 0.5 mg, 0.5  mg, Oral, BID PRN    Review of Systems:  Completed. See pertinent positives and negatives above.     Physical Exam:  Vital signs: Blood pressure (!) 153/95, pulse 74, temperature 97.9 °F (36.6 °C), temperature source Oral, resp. rate 16, height 188 cm (6' 2\"), weight 190 lb (86.2 kg), SpO2 100%.    General: Alert, oriented, NAD, on room jonnie.   HEENT: Moist mucous membranes.   Neck: No lymphadenopathy.  Supple.  Respiratory: Non-labored breathing.  Abdomen: Nondistended.   Musculoskeletal: R hand edema. Difficulty moving fingers- mainly 3rd digit.    Integument: Erythema to R hand with puncture sites (dorsal aspect at 3rd MCP and between thumb/2nd digit)- no drainage noted. Tender to palpation. Rash to face/chest- patient feels this is eczema- no rash elsewhere.     Laboratory Data:  Recent Labs   Lab 07/01/24  1112   RBC 5.87*   HGB 17.8*   HCT 51.1   MCV 87.1   MCH 30.3   MCHC 34.8   RDW 11.8   NEPRELIM 8.23*   WBC 10.5   .0     Recent Labs   Lab 07/01/24  1112   *   BUN 17   CREATSERUM 0.89   CA 9.2   ALB 4.1      K 4.2      CO2 23.0   ALKPHO 103   AST 15   ALT 37   BILT 0.8   TP 7.9       Microbiology: Reviewed in EMR    Radiology: Reviewed.    Narrative  PROCEDURE:  XR HAND (MIN 3 VIEWS), RIGHT (CPT=73130)     TECHNIQUE:  Three views of the right hand were obtained.     COMPARISON:  None.     INDICATIONS:  dog bite to the right hand with pain, redness and swelling over the 2nd and 3rd metacarpals.     PATIENT STATED HISTORY: (As transcribed by Technologist)  Wilner states he was bit in the right hand last night by his wife's dog. Patient has pain, redness and swelling to right posterior hand over distal 2nd and 3rd metacarpals.     FINDINGS:  No acute osseous injury/fractures.  There is mild soft tissue swelling and subtle foci of soft tissue air along the medial aspect of the 2nd MCP joint.  No radiopaque foreign bodies.  Joint spaces are preserved.    Impression  CONCLUSION:    1.  Soft tissue swelling and subtle foci of soft tissue air along the medial aspect of the 2nd MCP joint.  2. No osseous abnormalities.       LOCATION:  [facility]    Dictated by (CST): Lara Urias DO on 6/30/2024 at 10:18 AM      Finalized by (CST): Lara Urias DO on 6/30/2024 at 10:19 AM     ASSESSMENT:  Right hand cellulitis, s/p dog bite   Concern for tenosynovitis and joint involvement.   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     TONI Otero   [facility], [place], operated by [facility]  [phone]    ID ATTENDING ADDENDUM     Pt seen an examined independently. Chart reviewed. Agree with above. Note has been reviewed by me and modified as needed.  Exam and Impression/ Recs as noted above.  Seems about the same, suspect will need I&D. Well covered with unasyn, cont for now. Await ortho eval  Will follow  D/w staff and with pt  More than 50% of clinical time and 100% of the clinical decision making performed by me.    Evonne Aleman MD

## 2024-07-03 NOTE — PROGRESS NOTES
[facility]   part of [facility]     Hospitalist Progress Note     Moo Cantor Patient Status:  Inpatient    10/8/1980 MRN [id number]   Location [facility] 3NW-A Attending Yakov Kaba MD   Hosp Day # 2 PCP None Pcp     Chief Complaint: Right hand redness/swelling    Subjective:     Patient has mild improvement today.  Denies fever, chills, n/v.  No cp or sob.  No other acute complaints.      Objective:    Review of Systems:   A comprehensive review of systems was completed; pertinent positive and negatives stated in subjective.    Vital signs:  Temp:  [98 °F (36.7 °C)-98.1 °F (36.7 °C)] 98 °F (36.7 °C)  Pulse:  [61-80] 61  Resp:  [18] 18  BP: (108-132)/(64-76) 108/64  SpO2:  [96 %-100 %] 96 %    Physical Exam:    General: No acute distress, pleasant   Respiratory: No wheezes, no rhonchi  Cardiovascular: S1, S2, regular rate and rhythm  Abdomen: Soft, Non-tender, non-distended, positive bowel sounds  Neuro: No new focal deficits.   Extremities: No edema.  Right hand with swelling, erythema, warmth, dec rom 2/2 pain, especially over 3rd digit, bite mamadou noted      Diagnostic Data:    Labs:  Recent Labs   Lab 24  1112   WBC 10.5   HGB 17.8*   MCV 87.1   .0       Recent Labs   Lab 24  1112   *   BUN 17   CREATSERUM 0.89   CA 9.2   ALB 4.1      K 4.2      CO2 23.0   ALKPHO 103   AST 15   ALT 37   BILT 0.8   TP 7.9       Estimated Creatinine Clearance: 123.1 mL/min (based on SCr of 0.89 mg/dL).    No results for input(s): \"TROP\", \"TROPHS\", \"CK\" in the last 168 hours.    No results for input(s): \"PTP\", \"INR\" in the last 168 hours.               Microbiology    Hospital Encounter on 24   1. Blood Culture     Status: None (Preliminary result)    Collection Time: 24 11:12 AM    Specimen: Blood,peripheral   Result Value Ref Range    Blood Culture Result No Growth 1 Day N/A         Imaging: Reviewed in Epic.    Medications:    enoxaparin  40 mg  Subcutaneous Daily    ampicillin-sulbactam  3 g Intravenous Q6H       Assessment & Plan:      #Right hand dog bite cellulitis with concern for tenosynovitis  Continue Unasyn  ID following  Hand surgery consult - may go for washout today  Pain control  F/u cultures     #Tobacco use   Nicotine replacement gum      Yakov Kaba MD    Supplementary Documentation:     Quality:  DVT Mechanical Prophylaxis:     Early ambuation  DVT Pharmacologic Prophylaxis   Medication    enoxaparin (Lovenox) 40 MG/0.4ML SUBQ injection 40 mg                Code Status: Not on file  Cho: No urinary catheter in place  Cho Duration (in days):   Central line:    ASHLEY:     Discharge is dependent on: clinical recovery   At this point Mr. Cantor is expected to be discharge to: Home    The 21st Century Cures Act makes medical notes like these available to patients in the interest of transparency. Please be advised this is a medical document. Medical documents are intended to carry relevant information, facts as evident, and the clinical opinion of the practitioner. The medical note is intended as peer to peer communication and may appear blunt or direct. It is written in medical language and may contain abbreviations or verbiage that are unfamiliar.

## 2024-07-03 NOTE — PLAN OF CARE
Pt remains NPO for OR this evening. Consent signed. Wife at bedside. Roght hand erythema and edema appears worse now than earlier. Right hand elevated on pillow.

## 2024-07-04 LAB
ANION GAP SERPL CALC-SCNC: 3 MMOL/L (ref 0–18)
BUN BLD-MCNC: 18 MG/DL (ref 9–23)
CALCIUM BLD-MCNC: 9 MG/DL (ref 8.5–10.1)
CHLORIDE SERPL-SCNC: 108 MMOL/L (ref 98–112)
CO2 SERPL-SCNC: 28 MMOL/L (ref 21–32)
CREAT BLD-MCNC: 1.04 MG/DL
EGFRCR SERPLBLD CKD-EPI 2021: 91 ML/MIN/1.73M2 (ref 60–?)
ERYTHROCYTE [DISTWIDTH] IN BLOOD BY AUTOMATED COUNT: 11.8 %
GLUCOSE BLD-MCNC: 113 MG/DL (ref 70–99)
HCT VFR BLD AUTO: 48.2 %
HGB BLD-MCNC: 16.8 G/DL
MCH RBC QN AUTO: 30.8 PG (ref 26–34)
MCHC RBC AUTO-ENTMCNC: 34.9 G/DL (ref 31–37)
MCV RBC AUTO: 88.4 FL
OSMOLALITY SERPL CALC.SUM OF ELEC: 291 MOSM/KG (ref 275–295)
PLATELET # BLD AUTO: 283 10(3)UL (ref 150–450)
POTASSIUM SERPL-SCNC: 4.7 MMOL/L (ref 3.5–5.1)
RBC # BLD AUTO: 5.45 X10(6)UL
SODIUM SERPL-SCNC: 139 MMOL/L (ref 136–145)
WBC # BLD AUTO: 10.6 X10(3) UL (ref 4–11)

## 2024-07-04 PROCEDURE — 99232 SBSQ HOSP IP/OBS MODERATE 35: CPT | Performed by: HOSPITALIST

## 2024-07-04 NOTE — ANESTHESIA PREPROCEDURE EVALUATION
PRE-OP EVALUATION    Patient Name: Moo Cantor    Admit Diagnosis: Dog bite, hand, right, initial encounter [S61.451A, W54.0XXA]    Pre-op Diagnosis: Abrasion of hand with infection [S60.519A, L08.9]    IRRIGATION & DEBRIDEMENT RIGHT HAND    Anesthesia Procedure: IRRIGATION & DEBRIDEMENT RIGHT HAND (Right)    Surgeons and Role:     * Wilfredo Mosqueda MD - Primary    Pre-op vitals reviewed.  Temp: 97.9 °F (36.6 °C)  Pulse: 74  Resp: 16  BP: 153/95  SpO2: 100 %  Body mass index is 24.39 kg/m².    Current medications reviewed.  Hospital Medications:   [COMPLETED] ampicillin-sulbactam (Unasyn) 3 g in sodium chloride 0.9% 100mL IVPB-ADD  3 g Intravenous Once    [COMPLETED] vancomycin (Vancocin) 1.25 g in sodium chloride 0.9% 250mL IVPB premix  15 mg/kg Intravenous Once    [Transfer Hold] melatonin tab 3 mg  3 mg Oral Nightly PRN    [Transfer Hold] enoxaparin (Lovenox) 40 MG/0.4ML SUBQ injection 40 mg  40 mg Subcutaneous Daily    [Transfer Hold] morphINE PF 2 MG/ML injection 1 mg  1 mg Intravenous Q2H PRN    Or    [Transfer Hold] morphINE PF 2 MG/ML injection 2 mg  2 mg Intravenous Q2H PRN    Or    [Transfer Hold] morphINE PF 4 MG/ML injection 4 mg  4 mg Intravenous Q2H PRN    [Transfer Hold] acetaminophen (Tylenol) tab 650 mg  650 mg Oral Q4H PRN    Or    [Transfer Hold] HYDROcodone-acetaminophen (Norco) 5-325 MG per tab 1 tablet  1 tablet Oral Q4H PRN    Or    [Transfer Hold] HYDROcodone-acetaminophen (Norco) 5-325 MG per tab 2 tablet  2 tablet Oral Q4H PRN    [Transfer Hold] acetaminophen (Tylenol Extra Strength) tab 500 mg  500 mg Oral Q4H PRN    [Transfer Hold] ondansetron (Zofran) 4 MG/2ML injection 4 mg  4 mg Intravenous Q6H PRN    [Transfer Hold] prochlorperazine (Compazine) 10 MG/2ML injection 5 mg  5 mg Intravenous Q8H PRN    [Transfer Hold] polyethylene glycol (PEG 3350) (Miralax) 17 g oral packet 17 g  17 g Oral Daily PRN    [Transfer Hold] sennosides (Senokot) tab 17.2 mg  17.2 mg Oral Nightly PRN     [Transfer Hold] bisacodyl (Dulcolax) 10 MG rectal suppository 10 mg  10 mg Rectal Daily PRN    [Transfer Hold] fleet enema (Fleet) 7-19 GM/118ML rectal enema 133 mL  1 enema Rectal Once PRN    ampicillin-sulbactam (Unasyn) 3 g in sodium chloride 0.9% 100mL IVPB-ADD  3 g Intravenous Q6H    [COMPLETED] ketorolac (Toradol) 15 MG/ML injection 15 mg  15 mg Intravenous Once    [Transfer Hold] diphenhydrAMINE (Benadryl) 50 mg/mL  injection 25 mg  25 mg Intravenous Q6H PRN    [COMPLETED] diphenhydrAMINE (Benadryl) 50 mg/mL  injection        [] ketorolac (Toradol) 15 MG/ML injection 15 mg  15 mg Intravenous Q6H PRN    [Transfer Hold] ALPRAZolam (Xanax) tab 0.5 mg  0.5 mg Oral BID PRN       Outpatient Medications:     Medications Prior to Admission   Medication Sig Dispense Refill Last Dose    amoxicillin clavulanate 875-125 MG Oral Tab Take 1 tablet by mouth 2 (two) times daily for 10 days. 20 tablet 0 2024 at 0730       Allergies: Vancomycin      Anesthesia Evaluation    Patient summary reviewed.    Anesthetic Complications  (-) history of anesthetic complications         GI/Hepatic/Renal    Negative GI/hepatic/renal ROS.                             Cardiovascular    Negative cardiovascular ROS.    Exercise tolerance: good     MET: >4                                           Endo/Other    Negative endo/other ROS.                              Pulmonary    Negative pulmonary ROS.                       Neuro/Psych    Negative neuro/psych ROS.                                  Past Surgical History:   Procedure Laterality Date    Other surgical history       Social History     Socioeconomic History    Marital status:    Tobacco Use    Smoking status: Former     Current packs/day: 0.75     Types: Cigarettes     Passive exposure: Never    Smokeless tobacco: Never   Substance and Sexual Activity    Alcohol use: Yes     Comment: 1-2 times a week    Drug use: Not Currently     History   Drug Use Unknown      Available pre-op labs reviewed.  Lab Results   Component Value Date    WBC 10.5 07/01/2024    RBC 5.87 (H) 07/01/2024    HGB 17.8 (H) 07/01/2024    HCT 51.1 07/01/2024    MCV 87.1 07/01/2024    MCH 30.3 07/01/2024    MCHC 34.8 07/01/2024    RDW 11.8 07/01/2024    .0 07/01/2024     Lab Results   Component Value Date     07/01/2024    K 4.2 07/01/2024     07/01/2024    CO2 23.0 07/01/2024    BUN 17 07/01/2024    CREATSERUM 0.89 07/01/2024     (H) 07/01/2024    CA 9.2 07/01/2024            Airway      Mallampati: II  Mouth opening: >3 FB  TM distance: > 6 cm  Neck ROM: full Cardiovascular    Cardiovascular exam normal.  Rhythm: regular  Rate: normal     Dental    Dentition appears grossly intact         Pulmonary    Pulmonary exam normal.  Breath sounds clear to auscultation bilaterally.               Other findings              ASA: 2   Plan: general  NPO status verified and patient meets guidelines.  Patient has not taken beta blockers in last 24 hours.  Post-procedure pain management plan discussed with surgeon and patient.    Comment: I spoke with the patient and discussed the risks of general anesthesia, which include nausea and vomiting; sore throat; injury to the lips, gums, teeth, and eyes; cardiac, pulmonary, and neurologic events; aspiration; and allergic reactions. The patient understands these risks and consents to receiving general anesthesia for this procedure.  Plan/risks discussed with: patient and spouse                Present on Admission:  **None**

## 2024-07-04 NOTE — PROGRESS NOTES
[facility]   part of [facility]     Hospitalist Progress Note     Moo Cantor Patient Status:  Inpatient    10/8/1980 MRN [id number]   [facility] 3NW-A Attending Yakov Kaba MD   Hosp Day # 3 PCP None Pcp     Chief Complaint: Right hand redness/swelling    Subjective:     S/p surgery yesterday.  Pain is slightly better today.  Denies fever, chills, n/v.  No other acute complaints.     Objective:    Review of Systems:   A comprehensive review of systems was completed; pertinent positive and negatives stated in subjective.    Vital signs:  Temp:  [97.2 °F (36.2 °C)-97.9 °F (36.6 °C)] 97.9 °F (36.6 °C)  Pulse:  [60-76] 63  Resp:  [11-17] 16  BP: ()/(49-95) 104/49  SpO2:  [96 %-100 %] 96 %    Physical Exam:    General: No acute distress, pleasant   Respiratory: No wheezes, no rhonchi  Cardiovascular: S1, S2, regular rate and rhythm  Abdomen: Soft, Non-tender, non-distended, positive bowel sounds  Neuro: No new focal deficits.   Extremities: No edema.  Right hand with dressing in place, wound site c/d/i    Diagnostic Data:    Labs:  Recent Labs   Lab 24  1112   WBC 10.5   HGB 17.8*   MCV 87.1   .0       Recent Labs   Lab 24  1112   *   BUN 17   CREATSERUM 0.89   CA 9.2   ALB 4.1      K 4.2      CO2 23.0   ALKPHO 103   AST 15   ALT 37   BILT 0.8   TP 7.9       Estimated Creatinine Clearance: 123.1 mL/min (based on SCr of 0.89 mg/dL).    No results for input(s): \"TROP\", \"TROPHS\", \"CK\" in the last 168 hours.    No results for input(s): \"PTP\", \"INR\" in the last 168 hours.               Microbiology    Hospital Encounter on 24   1. Blood Culture     Status: None (Preliminary result)    Collection Time: 24 11:12 AM    Specimen: Blood,peripheral   Result Value Ref Range    Blood Culture Result No Growth 2 Days N/A         Imaging: Reviewed in Epic.    Medications:    ketorolac  30 mg Intravenous Q6H    enoxaparin  40 mg Subcutaneous Daily     ampicillin-sulbactam  3 g Intravenous Q6H       Assessment & Plan:      #Right hand dog bite cellulitis with concern for tenosynovitis  S/p right hand I&D, 3rd digit ECD tenosynovectomy, MCP I&D - 7/3  Hand surgery, ID consult  Continue Unasyn  Pain control  F/u cultures     #Tobacco use   Nicotine replacement gum      Yakov Kaba MD    Supplementary Documentation:     Quality:  DVT Mechanical Prophylaxis:     Early ambuation  DVT Pharmacologic Prophylaxis   Medication    enoxaparin (Lovenox) 40 MG/0.4ML SUBQ injection 40 mg                Code Status: Not on file  Cho: No urinary catheter in place  Cho Duration (in days):   Central line:    ASHLEY:     Discharge is dependent on: clinical recovery   At this point Mr. Cantor is expected to be discharge to: Home    The 21st Century Cures Act makes medical notes like these available to patients in the interest of transparency. Please be advised this is a medical document. Medical documents are intended to carry relevant information, facts as evident, and the clinical opinion of the practitioner. The medical note is intended as peer to peer communication and may appear blunt or direct. It is written in medical language and may contain abbreviations or verbiage that are unfamiliar.

## 2024-07-04 NOTE — ANESTHESIA PROCEDURE NOTES
Airway  Date/Time: 7/3/2024 8:41 PM  Urgency: elective    Airway not difficult    General Information and Staff    Patient location during procedure: OR  Anesthesiologist: Willian Pavon MD  Performed: anesthesiologist   Performed by: Willian Pavon MD  Authorized by: Willian Pavon MD      Indications and Patient Condition  Indications for airway management: anesthesia  Sedation level: deep  Preoxygenated: yes  Patient position: sniffing  Mask difficulty assessment: 1 - vent by mask    Final Airway Details  Final airway type: supraglottic airway      Successful airway: classic  Size 4       Number of attempts at approach: 1  Number of other approaches attempted: 0

## 2024-07-04 NOTE — PLAN OF CARE
Pt has returned from PACU to SCU. Pt VSS, AOx4. Room air; pt denies SHANTEL/SOB/lightheadedness. Pt up standby/ad dixon; steady gait. Voids. No BM since return to unit. Regular diet tolerated well. No nausea. Pain 6-7/10 managed well with pain meds per MAR. IV fluids continued per order, ABX continued. Right hand remains covered from surgery; ace wrap c/d/I. No redness noted outside of ace wrap. All 5 digits appear edematous; middle finger pt reports as feeling stiff. Otherwise all fingers easily moved by pt. Ice packs intermittently to right hand/arm. Bilat SCDs continued. OT eval in AM. Fall & safety precautions in place. POC discussed.

## 2024-07-04 NOTE — OPERATIVE REPORT
Operative Report       Patient Name: Moo Cantor    7/3/2024    Preoperative Diagnosis:     Right dorsal hand infection    Postoperative Diagnosis:     Right dorsal hand infection    Surgeons and Role:     * Wilfredo Mosqueda MD - Primary     Assistant:  None    Procedures:     Right dorsal hand irrigation and debridement of subcutaneous tissue  Right middle finger EDC tendon tenosynovectomy  Right middle finger MCP joint arthrotomy with irrigation and debridement    Antibiotics: Ancef 2 g    Surgical Findings: Mild EDC tenosynovitis, no abscess or purulent fluid, MCP joint synovitis without gross purulence from the joint    Anesthesia: General plus local    Complications: None    Specimen: fluid cx superficial and deep    Condition: Stable    Estimated Blood Loss: 15 mL    Indications:  43 year old male with a dog bite resulting in soft tissue infection that failed 48 hours of antibiotics. The risks associated with the procedure, expected  outcome, the expected time to recovery, and the rehab required were reviewed.       Procedure:  Patient was met in the preoperative holding area where consent was verified, laterality was marked with the surgeon's initials, and the H&P was updated. Patient was brought to the operating room on a transport cart.  Patient was transferred from the transport cart onto the operating room table and placed in a supine position.  An upper arm tourniquet was placed.  The arm was prepped and draped in the usual sterile fashion.  A surgical timeout was performed.  Antibiotics were fully infused.  The limb was elevated and exsanguinated using Esmarch bandage.  Tourniquet was raised to 250 mmHg.    15 blade was used to make an incision over the dorsal aspect of the MCP.  Incision was carried through dermis.  Adson Boothe scissors were used to dissect through subcutaneous tissue down to the extensor tendon to the middle finger.  Tenosynovitis was noted as well as some devitalized  tissue.  Tenotomy's were used to perform a tenosynovectomy of the EDC tendon.  Devitalized tissue which was minimal was sharply excised.  No evidence of abscess or grossly purulent fluid was found.  A culture swab was used to swab the subcutaneous space.  A 15 blade was used to make a small arthrotomy into the MCP joint.  Fluid was returned which not appear grossly purulent.  Culture swab was used to sample the MCP joint fluid.  There was some synovitis noted.  The synovitis was excised.  A 16-gauge catheter was used to irrigate the MCP joint.  3 L of saline was used to irrigate the subcutaneous space over the dorsal aspect of the hand.  The tourniquet was let down and hemostasis achieved with gentle pressure and bipolar cautery.  0.5% Marcaine was injected into the subcutaneous space.    Closure:  4-0 nylon was used to reapproximate the skin edges.    Dressing/Splint:  Bacitracin, Adaptic, 4 x 4 gauze and Ace wrap was used to hold the dressing in place.    Post Operative:  Patient was woken up from anesthesia and taken to PACU for further recovery and discharge home.    Wilfredo Mosqueda MD   Hand, Wrist, & Elbow Surgery  [email or web address]  t: [phone]  f: [phone]

## 2024-07-04 NOTE — ANESTHESIA POSTPROCEDURE EVALUATION
[facility]    Moo Cantor Patient Status:  Inpatient   Age/Gender 43 year old male MRN [id number]   Location [facility] 3NW-A Attending Yakov Kaba MD   Hosp Day # 2 PCP None Pcp       Anesthesia Post-op Note    IRRIGATION & DEBRIDEMENT RIGHT HAND    Procedure Summary       Date: 07/03/24 Room / Location:  MAIN OR 12 / [facility] MAIN OR    Anesthesia Start: 2035 Anesthesia Stop: 2137    Procedure: IRRIGATION & DEBRIDEMENT RIGHT HAND (Right: Hand) Diagnosis:       Abrasion of hand with infection      (Abrasion of hand with infection [S60.519A, L08.9])    Surgeons: Wilfredo Mosqueda MD Anesthesiologist: Willian Pavon MD    Anesthesia Type: general ASA Status: 2            Anesthesia Type: general    Vitals Value Taken Time   /83 07/03/24 2140   Temp 97.2 °F (36.2 °C) 07/03/24 2140   Pulse 68 07/03/24 2140   Resp 15 07/03/24 2140   SpO2 100 % 07/03/24 2140   Vitals shown include unfiled device data.    Patient Location: PACU    Anesthesia Type: general    Airway Patency: patent and extubated    Postop Pain Control: adequate    Mental Status: mildly sedated but able to meaningfully participate in the post-anesthesia evaluation    Nausea/Vomiting: none    Cardiopulmonary/Hydration status: stable euvolemic    Complications: no apparent anesthesia related complications    Postop vital signs: stable    Dental Exam: Unchanged from Preop    Patient to be discharged from PACU when criteria met.

## 2024-07-04 NOTE — PROGRESS NOTES
[facility]   part of [facility] ID PROGRESS NOTE    Moo Cantor Patient Status:  Observation    10/8/1980 MRN [id number]   Location [facility] [place] 3NW-A Attending Pawan Eng DO   Hosp Day # 3 PCP None Pcp     Abx: IV Unasyn D#3, s/p IV vanco pod#1    Subjective: Patient seen and examined today. Less pain. No new issues    Allergies:  Allergies   Allergen Reactions    Vancomycin FACE FLUSHING     Redness and itching on scalp,face ,neck and upper chest area       Medications:    Current Facility-Administered Medications:     ketorolac (Toradol) 15 MG/ML injection 30 mg, 30 mg, Intravenous, Q6H    melatonin tab 3 mg, 3 mg, Oral, Nightly PRN    enoxaparin (Lovenox) 40 MG/0.4ML SUBQ injection 40 mg, 40 mg, Subcutaneous, Daily    morphINE PF 2 MG/ML injection 1 mg, 1 mg, Intravenous, Q2H PRN **OR** morphINE PF 2 MG/ML injection 2 mg, 2 mg, Intravenous, Q2H PRN **OR** morphINE PF 4 MG/ML injection 4 mg, 4 mg, Intravenous, Q2H PRN    acetaminophen (Tylenol) tab 650 mg, 650 mg, Oral, Q4H PRN **OR** HYDROcodone-acetaminophen (Norco) 5-325 MG per tab 1 tablet, 1 tablet, Oral, Q4H PRN **OR** HYDROcodone-acetaminophen (Norco) 5-325 MG per tab 2 tablet, 2 tablet, Oral, Q4H PRN    acetaminophen (Tylenol Extra Strength) tab 500 mg, 500 mg, Oral, Q4H PRN    ondansetron (Zofran) 4 MG/2ML injection 4 mg, 4 mg, Intravenous, Q6H PRN    prochlorperazine (Compazine) 10 MG/2ML injection 5 mg, 5 mg, Intravenous, Q8H PRN    polyethylene glycol (PEG 3350) (Miralax) 17 g oral packet 17 g, 17 g, Oral, Daily PRN    sennosides (Senokot) tab 17.2 mg, 17.2 mg, Oral, Nightly PRN    bisacodyl (Dulcolax) 10 MG rectal suppository 10 mg, 10 mg, Rectal, Daily PRN    fleet enema (Fleet) 7-19 GM/118ML rectal enema 133 mL, 1 enema, Rectal, Once PRN    ampicillin-sulbactam (Unasyn) 3 g in sodium chloride 0.9% 100mL IVPB-ADD, 3 g, Intravenous, Q6H    diphenhydrAMINE (Benadryl) 50 mg/mL  injection 25 mg, 25 mg, Intravenous, Q6H  PRN    ALPRAZolam (Xanax) tab 0.5 mg, 0.5 mg, Oral, BID PRN    Review of Systems:  Completed. See pertinent positives and negatives above.     Physical Exam:  Vital signs: Blood pressure 133/65, pulse 64, temperature 98.6 °F (37 °C), temperature source Oral, resp. rate 14, height 6' 2\" (1.88 m), weight 190 lb (86.2 kg), SpO2 100%.    General: Alert, oriented, NAD, on room air.   HEENT: Moist mucous membranes.   Neck: No lymphadenopathy.  Supple.  Respiratory: Non-labored breathing.  Abdomen: Nondistended.   Musculoskeletal: R hand with c/d/d- no cellulitis of exposed skin  Integument: Rash to face/chest stable- patient feels this is eczema- no rash elsewhere.     Laboratory Data:  Recent Labs   Lab 07/01/24  1112 07/04/24  0809   RBC 5.87* 5.45   HGB 17.8* 16.8   HCT 51.1 48.2   MCV 87.1 88.4   MCH 30.3 30.8   MCHC 34.8 34.9   RDW 11.8 11.8   NEPRELIM 8.23*  --    WBC 10.5 10.6   .0 283.0     Recent Labs   Lab 07/01/24  1112 07/04/24  0809   * 113*   BUN 17 18   CREATSERUM 0.89 1.04   CA 9.2 9.0   ALB 4.1  --     139   K 4.2 4.7    108   CO2 23.0 28.0   ALKPHO 103  --    AST 15  --    ALT 37  --    BILT 0.8  --    TP 7.9  --        Microbiology: Reviewed in EMR    Radiology: Reviewed.    Narrative  PROCEDURE:  XR HAND (MIN 3 VIEWS), RIGHT (CPT=73130)     TECHNIQUE:  Three views of the right hand were obtained.     COMPARISON:  None.     INDICATIONS:  dog bite to the right hand with pain, redness and swelling over the 2nd and 3rd metacarpals.     PATIENT STATED HISTORY: (As transcribed by Technologist)  Wilner states he was bit in the right hand last night by his wife's dog. Patient has pain, redness and swelling to right posterior hand over distal 2nd and 3rd metacarpals.     FINDINGS:  No acute osseous injury/fractures.  There is mild soft tissue swelling and subtle foci of soft tissue air along the medial aspect of the 2nd MCP joint.  No radiopaque foreign bodies.  Joint spaces are  preserved.    Impression  CONCLUSION:    1. Soft tissue swelling and subtle foci of soft tissue air along the medial aspect of the 2nd MCP joint.  2. No osseous abnormalities.       LOCATION:  [facility]    Dictated by (CST): Lara Urias DO on 6/30/2024 at 10:18 AM      Finalized by (CST): Lara Urias DO on 6/30/2024 at 10:19 AM     ASSESSMENT:  Right hand cellulitis/ R dorsal hand infection after dog bite   Concern for tenosynovitis and joint involvement, s/p I&D on 7/3- noted to have tenosynovitis. No ag purulence at the time of arthrotomy but felt to have some synovitis. 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

## 2024-07-04 NOTE — ANESTHESIA PROCEDURE NOTES
Peripheral IV  Date/Time: 7/3/2024 8:45 PM  Inserted by: Willian Pavon MD    Placement  Needle size: 20 G  Laterality: left  Location: hand  Local anesthetic: none  Site prep: alcohol  Technique: anatomical landmarks  Attempts: 1

## 2024-07-04 NOTE — PLAN OF CARE
Patient ambulating in halls. VSS. Pain managed with oral pain medication. Right hand wrapped with ace wrap. C/D/I. Denies chest/calf pain. Encouraged ambulation. POC discussed, all questions and concerns addressed. All safety measures in place.

## 2024-07-05 PROCEDURE — 99232 SBSQ HOSP IP/OBS MODERATE 35: CPT | Performed by: HOSPITALIST

## 2024-07-05 NOTE — PAYOR COMM NOTE
--------------  CONTINUED STAY REVIEW    Payor: [employer]ILMORE  Subscriber #:  [id number]  Authorization Number: [id number]    Admit date: 7/1/24  Admit time:  1:46 PM         7-3-24   Preoperative Diagnosis:      Right dorsal hand infection     Postoperative Diagnosis:      Right dorsal hand infection    Procedures:      Right dorsal hand irrigation and debridement of subcutaneous tissue  Right middle finger EDC tendon tenosynovectomy  Right middle finger MCP joint arthrotomy with irrigation and debridement     Antibiotics: Ancef 2 g     Surgical Findings: Mild EDC tenosynovitis, no abscess or purulent fluid, MCP joint synovitis without gross purulence from the joint        REVIEW DOCUMENTATION:    7-4-24     S/p surgery yesterday. Pain is slightly better today.     Vital signs:  Temp:  [97.2 °F (36.2 °C)-97.9 °F (36.6 °C)] 97.9 °F (36.6 °C)  Pulse:  [60-76] 63  Resp:  [11-17] 16  BP: ()/(49-95) 104/49  SpO2:  [96 %-100 %] 96 %     Physical Exam:    General: No acute distress, pleasant   Respiratory: No wheezes, no rhonchi  Cardiovascular: S1, S2, regular rate and rhythm  Abdomen: Soft, Non-tender, non-distended, positive bowel sounds  Neuro: No new focal deficits.   Extremities: No edema.  Right hand with dressing in place, wound site c/d/I    Assessment & Plan:  #Right hand dog bite cellulitis with concern for tenosynovitis  S/p right hand I&D, 3rd digit ECD tenosynovectomy, MCP I&D - 7/3  Hand surgery, ID consult  Continue Unasyn  Pain control  F/u cultures       Recent Labs   Lab 07/04/24  0809   RBC 5.45   HGB 16.8   HCT 48.2   MCV 88.4   MCH 30.8   MCHC 34.9   RDW 11.8   NEPRELIM  --    WBC 10.6   .0          Recent Labs   Lab 07/04/24  0809   *   BUN 18   CREATSERUM 1.04   CA 9.0   ALB  --       K 4.7      CO2 28.0   ALKPHO  --    AST  --    ALT  --    BILT  --    TP  --            REVIEW DOCUMENTATION  7-5-24     ID      General: Alert, oriented, NAD, on room air.   HEENT: Moist  mucous membranes.   Neck: No lymphadenopathy.  Supple.  Respiratory: Non-labored breathing.  Abdomen: Nondistended.   Musculoskeletal: R hand with c/d/d- no cellulitis of exposed skin  Integument: No open wounds to exposed skin.      Reports pain to his R hand. Afebrile.      ASSESSMENT:  Right hand cellulitis/ R dorsal hand infection after dog bite   Concern for tenosynovitis and joint involvement, s/p I&D on 7/3- noted to have tenosynovitis. No ag purulence at the time of arthrotomy but felt to have some synovitis. 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 Discussed recommendation and reason for recommendation extensively with the patient. He is not agreeable at this time for a PICC line/IV abx on dc but states he will think about it and d/w his wife.           MEDICATIONS ADMINISTERED IN LAST 1 DAY:  ampicillin-sulbactam (Unasyn) 3 g in sodium chloride 0.9% 100mL IVPB-ADD       Date Action Dose Route User    7/5/2024 0631 New Bag 3 g Intravenous Stromberg, Ashly, RN    7/5/2024 0015 New Bag 3 g Intravenous Silvia Rajan RN    7/4/2024 1656 New Bag 3 g Intravenous Brigette James RN          HYDROcodone-acetaminophen (Norco) 5-325 MG per tab 2 tablet       Date Action Dose Route User    7/5/2024 0530 Given 2 tablet Oral Silvia Rajan RN    7/4/2024 2132 Given 2 tablet Oral Stromberg, Ashly, RN    7/4/2024 1656 Given 2 tablet Oral Brigette James RN    7/4/2024 1209 Given 2 tablet Oral Brigette James RN            Vitals (last day)       Date/Time Temp Pulse Resp BP SpO2 Weight O2 Device O2 Flow Rate (L/min) [facility]    07/05/24 0509 98.1 °F (36.7 °C) 64 18 128/76 97 % -- None (Room air) -- [place]    07/05/24 0020 97.8 °F (36.6 °C) 73 16  131/70 97 % -- None (Room air) -- [place]    07/04/24 1959 97.7 °F (36.5 °C) 78 18 153/84 100 % -- None (Room air) -- [place]    07/04/24 1700 97.9 °F (36.6 °C) 69 16 161/88 100 % -- None (Room air) -- [place]    07/04/24 1215 98.6 °F (37 °C) 64 14 133/65 100 % -- None (Room air) -- [place]    07/04/24 0919 98.6 °F (37 °C) 84 16 138/73 97 % -- None (Room air) -- FS    07/04/24 0532 97.9 °F (36.6 °C) 63 16 104/49 96 % -- None (Room air) -- [place]    07/04/24 0033 97.7 °F (36.5 °C) 66 16 112/67 98 % -- None (Room air) -- [place]THON[place]

## 2024-07-05 NOTE — OCCUPATIONAL THERAPY NOTE
OCCUPATIONAL THERAPY EVALUATION - INPATIENT    Room Number: 330/330-A  Evaluation Date: 7/5/2024     Type of Evaluation: Initial  Presenting Problem: Right hand dog bite cellulitis with concern for tenosynovitis    Physician Order: IP Consult to Occupational Therapy  Reason for Therapy:  ADL/IADL Dysfunction and Discharge Planning      OCCUPATIONAL THERAPY ASSESSMENT   Patient is a 43 year old male admitted on 7/1/2024 with Presenting Problem: Right hand dog bite cellulitis with concern for tenosynovitis.    Patient is currently functioning at baseline with toileting, upper body dressing, lower body dressing, grooming, eating, bed mobility, transfers, stating sitting balance, dynamic sitting balance, static standing balance, dynamic standing balance, maintaining seated position, functional standing tolerance, and energy conservation strategies.  Prior to admission, patient's baseline is IND c ADLs/IADLs, and driving. Pt is employed full time and works as a . Pt lives at home with supportive spouse and 3 children. Patient met all OT goals at supervision or better level.  Patient reports no further questions/concerns at this time. OP OT orders will be ordered for pt to follow up with when appropriate and surgeon gives clearance.    Patient will be discharged from acute OT services at this time.      WEIGHT BEARING RESTRICTION  Weight Bearing Restriction: R upper extremity  R Upper Extremity: Other (Comment) (Weight restriction of lifting 1lb)             Recommendations for nursing staff:   Transfers: IND  Toileting location: Bathroom toilet    EVALUATION SESSION:  Patient at start of session: Seated EOB  FUNCTIONAL TRANSFER ASSESSMENT  Sit to Stand: Edge of Bed  Edge of Bed: Independent    BED MOBILITY     BALANCE ASSESSMENT  Static Sitting: Independent  Sitting Bilateral: Independent  Static Standing: Independent  Standing Bilateral: Independent    FUNCTIONAL ADL ASSESSMENT       ACTIVITY TOLERANCE: Pt  completed light AROM/PROM of R hand and digits; pt reported the pain is better with medication, but stated its just stiff and hard to move.                         O2 SATURATIONS       COGNITION  Overall Cognitive Status:  WFL - within functional limits  COGNITION ASSESSMENTS       Upper Extremity:   ROM: L WFL; R impaired at MCP/PIP/DIP c 25% or less of flexion  Strength: L WFL; R not tested  Appearance: R hand edematous compared to L, wrapped in dressing  Coordination:  Gross motor: WFL  Fine motor: Impaired  Sensation: not tested    EDUCATION PROVIDED  Patient: Role of Occupational Therapy; Plan of Care; Discharge Recommendations; Weight Bear Status; Surgical Precautions; Posture/Positioning; Edema Reduction; Compensatory ADL Techniques; Proper Body Mechanics  Patient's Response to Education: Verbalized Understanding    Equipment used: none used      Therapist comments: Pt reports concerns with returning back to work; pt demonstrated compensatory techniques to use while R hand heals and verbalized understanding of his restrictions. Pt responded well to PROM & AROM exercises to complete (tendon glides, abduction/adduction of digits, AROM to tolerance) to R hand as he continues to progress with recovery. Pt agreeable to follow up with OP OT after surgeon gives him the OK.     Patient End of Session: In bed;Needs met;Call light within reach;RN aware of session/findings;All patient questions and concerns addressed    OCCUPATIONAL PROFILE    HOME SITUATION  Type of Home: House  Home Layout: One level  Lives With: Spouse;Daughter               Occupation/Status: Car   Hand Dominance: Right  Drives: Yes       Prior Level of Function: IND c ADLs/IADLs, driving    SUBJECTIVE  \"I paint cars\"    PAIN ASSESSMENT  Rating: Unable to rate  Location: R hand  Management Techniques: Repositioning;Ice;Activity promotion;Body mechanics;Relaxation (Pt in agreement with current management  techniques)    OBJECTIVE  Precautions: None  Fall Risk: Standard fall risk    WEIGHT BEARING RESTRICTION  Weight Bearing Restriction: R upper extremity  R Upper Extremity: Other (Comment) (Weight restriction of lifting 1lb)             AM-PAC ‘6-Clicks’ Inpatient Daily Activity Short Form  -   Putting on and taking off regular lower body clothing?: A Little  -   Bathing (including washing, rinsing, drying)?: A Little  -   Toileting, which includes using toilet, bedpan or urinal? : None  -   Putting on and taking off regular upper body clothing?: A Little  -   Taking care of personal grooming such as brushing teeth?: A Little  -   Eating meals?: A Little    AM-PAC Score:  Score: 19  Approx Degree of Impairment: 42.8%  Standardized Score (AM-PAC Scale): 40.22      ADDITIONAL TESTS     NEUROLOGICAL FINDINGS        PLAN   Patient has been evaluated and presents with no skilled Occupational Therapy needs at this time.  Patient discharged from Occupational Therapy services.  Please re-order if a new functional limitation presents during this admission.      Patient Evaluation Complexity Level:   Occupational Profile/Medical History LOW - Brief history including review of medical or therapy records    Specific performance deficits impacting engagement in ADL/IADL LOW  1 - 3 performance deficits    Client Assessment/Performance Deficits LOW - No comorbidities nor modifications of tasks    Clinical Decision Making LOW - Analysis of occupational profile, problem-focused assessments, limited treatment options    Overall Complexity LOW     OT Session Time:  15 minutes  Therapeutic Exercise: 8 minutes  Therapeutic Activity: 5 minutes

## 2024-07-05 NOTE — PROGRESS NOTES
[facility] [place]   part of [facility] ID PROGRESS NOTE    Moo Cantor Patient Status:  Observation    10/8/1980 MRN [id number]   Location [facility] 3NW-A Attending Pawan Eng DO   Hosp Day # 4 PCP None Pcp     Abx: IV Unasyn D#4, s/p IV vanco pod#2    Subjective: Patient seen and examined today. Reports pain to his R hand. Afebrile. On room air.     Allergies:  Allergies   Allergen Reactions    Vancomycin FACE FLUSHING     Redness and itching on scalp,face ,neck and upper chest area       Medications:    Current Facility-Administered Medications:     ketorolac (Toradol) 15 MG/ML injection 30 mg, 30 mg, Intravenous, Q6H    melatonin tab 3 mg, 3 mg, Oral, Nightly PRN    enoxaparin (Lovenox) 40 MG/0.4ML SUBQ injection 40 mg, 40 mg, Subcutaneous, Daily    morphINE PF 2 MG/ML injection 1 mg, 1 mg, Intravenous, Q2H PRN **OR** morphINE PF 2 MG/ML injection 2 mg, 2 mg, Intravenous, Q2H PRN **OR** morphINE PF 4 MG/ML injection 4 mg, 4 mg, Intravenous, Q2H PRN    acetaminophen (Tylenol) tab 650 mg, 650 mg, Oral, Q4H PRN **OR** HYDROcodone-acetaminophen (Norco) 5-325 MG per tab 1 tablet, 1 tablet, Oral, Q4H PRN **OR** HYDROcodone-acetaminophen (Norco) 5-325 MG per tab 2 tablet, 2 tablet, Oral, Q4H PRN    acetaminophen (Tylenol Extra Strength) tab 500 mg, 500 mg, Oral, Q4H PRN    ondansetron (Zofran) 4 MG/2ML injection 4 mg, 4 mg, Intravenous, Q6H PRN    prochlorperazine (Compazine) 10 MG/2ML injection 5 mg, 5 mg, Intravenous, Q8H PRN    polyethylene glycol (PEG 3350) (Miralax) 17 g oral packet 17 g, 17 g, Oral, Daily PRN    sennosides (Senokot) tab 17.2 mg, 17.2 mg, Oral, Nightly PRN    bisacodyl (Dulcolax) 10 MG rectal suppository 10 mg, 10 mg, Rectal, Daily PRN    fleet enema (Fleet) 7-19 GM/118ML rectal enema 133 mL, 1 enema, Rectal, Once PRN    ampicillin-sulbactam (Unasyn) 3 g in sodium chloride 0.9% 100mL IVPB-ADD, 3 g, Intravenous, Q6H    diphenhydrAMINE (Benadryl) 50 mg/mL  injection 25  mg, 25 mg, Intravenous, Q6H PRN    ALPRAZolam (Xanax) tab 0.5 mg, 0.5 mg, Oral, BID PRN    Review of Systems:  Completed. See pertinent positives and negatives above.     Physical Exam:  Vital signs: Blood pressure 128/76, pulse 64, temperature 98.1 °F (36.7 °C), temperature source Oral, resp. rate 18, height 188 cm (6' 2\"), weight 190 lb (86.2 kg), SpO2 97%.    General: Alert, oriented, NAD, on room air.   HEENT: Moist mucous membranes.   Neck: No lymphadenopathy.  Supple.  Respiratory: Non-labored breathing.  Abdomen: Nondistended.   Musculoskeletal: R hand with c/d/d- no cellulitis of exposed skin  Integument: No open wounds to exposed skin.     Laboratory Data:  Recent Labs   Lab 07/01/24  1112 07/04/24  0809   RBC 5.87* 5.45   HGB 17.8* 16.8   HCT 51.1 48.2   MCV 87.1 88.4   MCH 30.3 30.8   MCHC 34.8 34.9   RDW 11.8 11.8   NEPRELIM 8.23*  --    WBC 10.5 10.6   .0 283.0     Recent Labs   Lab 07/01/24  1112 07/04/24  0809   * 113*   BUN 17 18   CREATSERUM 0.89 1.04   CA 9.2 9.0   ALB 4.1  --     139   K 4.2 4.7    108   CO2 23.0 28.0   ALKPHO 103  --    AST 15  --    ALT 37  --    BILT 0.8  --    TP 7.9  --        Microbiology: Reviewed in EMR    Radiology: Reviewed.    Narrative  PROCEDURE:  XR HAND (MIN 3 VIEWS), RIGHT (CPT=73130)     TECHNIQUE:  Three views of the right hand were obtained.     COMPARISON:  None.     INDICATIONS:  dog bite to the right hand with pain, redness and swelling over the 2nd and 3rd metacarpals.     PATIENT STATED HISTORY: (As transcribed by Technologist)  Wilner states he was bit in the right hand last night by his wife's dog. Patient has pain, redness and swelling to right posterior hand over distal 2nd and 3rd metacarpals.     FINDINGS:  No acute osseous injury/fractures.  There is mild soft tissue swelling and subtle foci of soft tissue air along the medial aspect of the 2nd MCP joint.  No radiopaque foreign bodies.  Joint spaces are  preserved.    Impression  CONCLUSION:    1. Soft tissue swelling and subtle foci of soft tissue air along the medial aspect of the 2nd MCP joint.  2. No osseous abnormalities.       LOCATION:  [facility]    Dictated by (CST): Lara Urias DO on 6/30/2024 at 10:18 AM      Finalized by (CST): Lara Urias DO on 6/30/2024 at 10:19 AM     ASSESSMENT:  Right hand cellulitis/ R dorsal hand infection after dog bite   Concern for tenosynovitis and joint involvement, s/p I&D on 7/3- noted to have tenosynovitis. No ag purulence at the time of arthrotomy but felt to have some synovitis. 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 Discussed recommendation and reason for recommendation extensively with the patient. He is not agreeable at this time for a PICC line/IV abx on dc but states he will think about it and d/w his wife.    Discussed case with pt, RN, Dr. Aleman and Dr. Mosqueda.   Will follow    Kim Valverde PA-C    ID ATTENDING ADDENDUM     Pt seen an examined independently early this am, this is a late entry. Chart reviewed. Agree with above. Note has been reviewed by me and modified as needed.  Exam and Impression/ Recs as noted above.  Will follow  D/w pt, now not agreeable to IV abx on dc. Await cx. Will cont to d/w pt  More than 50% of clinical time and 100% of the clinical decision making performed by me.    Evonne Aleman MD

## 2024-07-05 NOTE — PROGRESS NOTES
[facility]   part of [facility]     Hospitalist Progress Note     Moo Cantor Patient Status:  Inpatient    10/8/1980 MRN [id number]   Location [facility] 3NW-A Attending Yakov Kaba MD   Hosp Day # 4 PCP None Pcp     Chief Complaint: Right hand redness/swelling    Subjective:     Pain is better.  Denies fever, chills, n/v.  No cp or sob.  No other acute complaints.      Objective:    Review of Systems:   A comprehensive review of systems was completed; pertinent positive and negatives stated in subjective.    Vital signs:  Temp:  [97.7 °F (36.5 °C)-98.6 °F (37 °C)] 98.1 °F (36.7 °C)  Pulse:  [64-84] 64  Resp:  [14-18] 18  BP: (128-161)/(65-88) 128/76  SpO2:  [97 %-100 %] 97 %    Physical Exam:    General: No acute distress, pleasant   Respiratory: No wheezes, no rhonchi  Cardiovascular: S1, S2, regular rate and rhythm  Abdomen: Soft, Non-tender, non-distended, positive bowel sounds  Neuro: No new focal deficits.   Extremities: No edema.  Right hand with dressing in place, wound site c/d/i    Diagnostic Data:    Labs:  Recent Labs   Lab 24  1112 24  0809   WBC 10.5 10.6   HGB 17.8* 16.8   MCV 87.1 88.4   .0 283.0       Recent Labs   Lab 24  1112 24  0809   * 113*   BUN 17 18   CREATSERUM 0.89 1.04   CA 9.2 9.0   ALB 4.1  --     139   K 4.2 4.7    108   CO2 23.0 28.0   ALKPHO 103  --    AST 15  --    ALT 37  --    BILT 0.8  --    TP 7.9  --        Estimated Creatinine Clearance: 105.4 mL/min (based on SCr of 1.04 mg/dL).    No results for input(s): \"TROP\", \"TROPHS\", \"CK\" in the last 168 hours.    No results for input(s): \"PTP\", \"INR\" in the last 168 hours.               Microbiology    Hospital Encounter on 24   1. Tissue Aerobic Culture     Status: None (Preliminary result)    Collection Time: 24  9:07 PM    Specimen: Hand, right; Tissue   Result Value Ref Range    Tissue Culture Result No Growth at 18-24 hrs. N/A    Tissue Smear  1+ WBCs seen N/A    Tissue Smear No organisms seen N/A   2. Blood Culture     Status: None (Preliminary result)    Collection Time: 07/01/24 11:12 AM    Specimen: Blood,peripheral   Result Value Ref Range    Blood Culture Result No Growth 3 Days N/A         Imaging: Reviewed in Epic.    Medications:    ketorolac  30 mg Intravenous Q6H    enoxaparin  40 mg Subcutaneous Daily    ampicillin-sulbactam  3 g Intravenous Q6H       Assessment & Plan:      #Right hand dog bite cellulitis with concern for tenosynovitis  S/p right hand I&D, 3rd digit ECD tenosynovectomy, MCP I&D - 7/3  Hand surgery, ID consult  Continue Unasyn - PICC and 4 weeks IV abx, patient would like to discuss with his wife first he would prefer an oral option if possible    Pain control  F/u cultures - NGTD    #Tobacco use   Nicotine replacement gum      Yakov Kaba MD    Supplementary Documentation:     Quality:  DVT Mechanical Prophylaxis:     Early ambuation  DVT Pharmacologic Prophylaxis   Medication    enoxaparin (Lovenox) 40 MG/0.4ML SUBQ injection 40 mg                Code Status: Not on file  Cho: No urinary catheter in place  Cho Duration (in days):   Central line:    ASHLEY:     Discharge is dependent on: clinical recovery   At this point Mr. Cantor is expected to be discharge to: Home    The 21st Century Cures Act makes medical notes like these available to patients in the interest of transparency. Please be advised this is a medical document. Medical documents are intended to carry relevant information, facts as evident, and the clinical opinion of the practitioner. The medical note is intended as peer to peer communication and may appear blunt or direct. It is written in medical language and may contain abbreviations or verbiage that are unfamiliar.

## 2024-07-05 NOTE — PLAN OF CARE
Pt has returned from PACU to SCU. Pt VSS, AOx4. Room air; pt denies SHANTEL/SOB/lightheadedness. Pt up standby/ad dixon; steady gait. Voids. No BM since prior to sx. Regular diet tolerated well. No nausea. Pain managed well with pain meds w/ ordered norco. ABX continued. Right hand remains covered from surgery; ace wrap c/d/I. Per orders, dressing remains in place. All 5 digits appear edematous. Otherwise all fingers easily moved by pt. Ice packs intermittently to right hand/arm. Bilat SCDs off, pt ambulating around unit. Fall & safety precautions in place. POC discussed.

## 2024-07-05 NOTE — PROGRESS NOTES
A&Ox4. VSS. RA. .  GI: Abdomen soft, nondistended.   Denies nausea.  : Voids.  Pain controlled with PRN pain medications  Up ad dixon.  Incisions: Ace wrap to left hand CDI. CMS good. Denies numbness/tingling.   Diet:tolerating general diet  IVF running per order.  All appropriate safety measures in place. All questions and concerns addressed. Will continue to monitor

## 2024-07-05 NOTE — DISCHARGE INSTRUCTIONS
- Weight Bearing:  no lifting, pushing, pulling, or carrying greater than 1-2 lbs with right hand  - Activity: any activity as tolerated within weight bearing restrictions.   - Wound Care: daily dressing changes with gauze and ace wrap. Keep incision dry - do not get wet until sutures are removed in clinic  - Antibiotics: per infectious disease  - Follow up: 12-14 days post op for suture removal  - Plan: Therapy office    IV antibiotic supplied through [facility]  Phone   [phone]    [facility] @ discharge  [facility]  P: [phone]  F: [phone]  Weekly CBC, BMP, ESR and CRP draws by [facility]   patient

## 2024-07-06 PROCEDURE — 99232 SBSQ HOSP IP/OBS MODERATE 35: CPT | Performed by: HOSPITALIST

## 2024-07-06 NOTE — PROGRESS NOTES
[facility]   part of [facility]     Hospitalist Progress Note     Moo Cantor Patient Status:  Inpatient    10/8/1980 MRN [id number]   Location [facility] 3NW-A Attending Yakov Kaba MD   Hosp Day # 5 PCP None Pcp     Chief Complaint: Right hand redness/swelling    Subjective:     Pain is better.  Denies fever, chills, n/v.  No other acute complaints .     Objective:    Review of Systems:   A comprehensive review of systems was completed; pertinent positive and negatives stated in subjective.    Vital signs:  Temp:  [97.7 °F (36.5 °C)-98 °F (36.7 °C)] 97.7 °F (36.5 °C)  Pulse:  [57-64] 57  Resp:  [18] 18  BP: (116-167)/(73-86) 116/73  SpO2:  [98 %-100 %] 99 %    Physical Exam:    General: No acute distress, pleasant   Respiratory: No wheezes, no rhonchi  Cardiovascular: S1, S2, regular rate and rhythm  Abdomen: Soft, Non-tender, non-distended, positive bowel sounds  Neuro: No new focal deficits.   Extremities: No edema.  Right hand with dressing in place, wound site c/d/i    Diagnostic Data:    Labs:  Recent Labs   Lab 24  1112 24  0809   WBC 10.5 10.6   HGB 17.8* 16.8   MCV 87.1 88.4   .0 283.0       Recent Labs   Lab 24  1112 24  0809   * 113*   BUN 17 18   CREATSERUM 0.89 1.04   CA 9.2 9.0   ALB 4.1  --     139   K 4.2 4.7    108   CO2 23.0 28.0   ALKPHO 103  --    AST 15  --    ALT 37  --    BILT 0.8  --    TP 7.9  --        Estimated Creatinine Clearance: 105.4 mL/min (based on SCr of 1.04 mg/dL).    No results for input(s): \"TROP\", \"TROPHS\", \"CK\" in the last 168 hours.    No results for input(s): \"PTP\", \"INR\" in the last 168 hours.               Microbiology    Hospital Encounter on 24   1. Tissue Aerobic Culture     Status: None (Preliminary result)    Collection Time: 24  9:07 PM    Specimen: Hand, right; Tissue   Result Value Ref Range    Tissue Culture Result No Growth 2 Days N/A    Tissue Smear 1+ WBCs seen N/A     Tissue Smear No organisms seen N/A   2. Anaerobic Culture     Status: None (Preliminary result)    Collection Time: 07/03/24  9:07 PM    Specimen: Hand, right; Tissue   Result Value Ref Range    Anaerobic Culture Pending N/A   3. Blood Culture     Status: None (Preliminary result)    Collection Time: 07/01/24 11:12 AM    Specimen: Blood,peripheral   Result Value Ref Range    Blood Culture Result No Growth 4 Days N/A         Imaging: Reviewed in Epic.    Medications:    enoxaparin  40 mg Subcutaneous Daily    ampicillin-sulbactam  3 g Intravenous Q6H       Assessment & Plan:      #Right hand dog bite cellulitis with concern for tenosynovitis  S/p right hand I&D, 3rd digit ECD tenosynovectomy, MCP I&D - 7/3  Hand surgery, ID consult  Continue Unasyn - PICC? 4 weeks abx.   Patient more agreeable at this time and wants to speak with ID again  Pain control  F/u cultures - NGTD    #Tobacco use   Nicotine replacement gum      Yakov Kaba MD    Supplementary Documentation:     Quality:  DVT Mechanical Prophylaxis:     Early ambuation  DVT Pharmacologic Prophylaxis   Medication    enoxaparin (Lovenox) 40 MG/0.4ML SUBQ injection 40 mg                Code Status: Not on file  Cho: No urinary catheter in place  Cho Duration (in days):   Central line:    ASHLEY: 7/6/2024    Discharge is dependent on: clinical recovery   At this point Mr. Cantor is expected to be discharge to: Home    The 21st Century Cures Act makes medical notes like these available to patients in the interest of transparency. Please be advised this is a medical document. Medical documents are intended to carry relevant information, facts as evident, and the clinical opinion of the practitioner. The medical note is intended as peer to peer communication and may appear blunt or direct. It is written in medical language and may contain abbreviations or verbiage that are unfamiliar.

## 2024-07-06 NOTE — PLAN OF CARE
Patient ambulating halls. VSS. Pain managed with oral pain medications. RIght hand dressing in place/c/d/I. Denies chest/calf pain. POC discussed, all questions and concerns addressed. All safety measures in place.

## 2024-07-06 NOTE — PLAN OF CARE
Alert, orient. Room air. Denies nausea. Tolerating diet. Afebrile. DRSG to rt hand C/D/I. Up ad dixon. Reviewed plan of care with patient.

## 2024-07-06 NOTE — PROGRESS NOTES
[facility]   part of [facility] ID PROGRESS NOTE    Moo Cantor Patient Status:  Observation    10/8/1980 MRN [id number]   Location [facility] 3NW-A Attending Pawan Eng DO   Hosp Day # 5 PCP None Pcp     Abx: IV Unasyn (-->) D#6, s/p IV vanco, POD #3    Subjective: Patient seen and examined this morning. Reports ongoing swelling of R hand, limited movements and pain. Afebrile overnight. We discussed antibiotic plan, PICC line.     Allergies:  Allergies   Allergen Reactions    Vancomycin FACE FLUSHING     Redness and itching on scalp,face ,neck and upper chest area       Medications:    Current Facility-Administered Medications:     melatonin tab 3 mg, 3 mg, Oral, Nightly PRN    enoxaparin (Lovenox) 40 MG/0.4ML SUBQ injection 40 mg, 40 mg, Subcutaneous, Daily    morphINE PF 2 MG/ML injection 1 mg, 1 mg, Intravenous, Q2H PRN **OR** morphINE PF 2 MG/ML injection 2 mg, 2 mg, Intravenous, Q2H PRN **OR** morphINE PF 4 MG/ML injection 4 mg, 4 mg, Intravenous, Q2H PRN    acetaminophen (Tylenol) tab 650 mg, 650 mg, Oral, Q4H PRN **OR** HYDROcodone-acetaminophen (Norco) 5-325 MG per tab 1 tablet, 1 tablet, Oral, Q4H PRN **OR** HYDROcodone-acetaminophen (Norco) 5-325 MG per tab 2 tablet, 2 tablet, Oral, Q4H PRN    acetaminophen (Tylenol Extra Strength) tab 500 mg, 500 mg, Oral, Q4H PRN    ondansetron (Zofran) 4 MG/2ML injection 4 mg, 4 mg, Intravenous, Q6H PRN    prochlorperazine (Compazine) 10 MG/2ML injection 5 mg, 5 mg, Intravenous, Q8H PRN    polyethylene glycol (PEG 3350) (Miralax) 17 g oral packet 17 g, 17 g, Oral, Daily PRN    sennosides (Senokot) tab 17.2 mg, 17.2 mg, Oral, Nightly PRN    bisacodyl (Dulcolax) 10 MG rectal suppository 10 mg, 10 mg, Rectal, Daily PRN    fleet enema (Fleet) 7-19 GM/118ML rectal enema 133 mL, 1 enema, Rectal, Once PRN    ampicillin-sulbactam (Unasyn) 3 g in sodium chloride 0.9% 100mL IVPB-ADD, 3 g, Intravenous, Q6H    diphenhydrAMINE (Benadryl) 50  mg/mL  injection 25 mg, 25 mg, Intravenous, Q6H PRN    ALPRAZolam (Xanax) tab 0.5 mg, 0.5 mg, Oral, BID PRN    Review of Systems:  Completed. See pertinent positives and negatives above.     Physical Exam:  Vital signs: Blood pressure 136/87, pulse 66, temperature 98.1 °F (36.7 °C), temperature source Oral, resp. rate 18, height 6' 2\" (1.88 m), weight 190 lb (86.2 kg), SpO2 100%.    General: Alert, oriented, NAD, on room air.   HEENT: Moist mucous membranes.   Neck: No lymphadenopathy.  Supple.  Respiratory: Non-labored breathing.  Abdomen: Nondistended.   Musculoskeletal: R hand with dressing c/d/d- no cellulitis of exposed skin, + edema  Integument: No open wounds to exposed skin.     Laboratory Data:  Recent Labs   Lab 07/01/24  1112 07/04/24  0809   RBC 5.87* 5.45   HGB 17.8* 16.8   HCT 51.1 48.2   MCV 87.1 88.4   MCH 30.3 30.8   MCHC 34.8 34.9   RDW 11.8 11.8   NEPRELIM 8.23*  --    WBC 10.5 10.6   .0 283.0     Recent Labs   Lab 07/01/24  1112 07/04/24  0809   * 113*   BUN 17 18   CREATSERUM 0.89 1.04   CA 9.2 9.0   ALB 4.1  --     139   K 4.2 4.7    108   CO2 23.0 28.0   ALKPHO 103  --    AST 15  --    ALT 37  --    BILT 0.8  --    TP 7.9  --        Microbiology: Reviewed in EMR    Radiology: Reviewed.    Narrative  PROCEDURE:  XR HAND (MIN 3 VIEWS), RIGHT (CPT=73130)     TECHNIQUE:  Three views of the right hand were obtained.     COMPARISON:  None.     INDICATIONS:  dog bite to the right hand with pain, redness and swelling over the 2nd and 3rd metacarpals.     PATIENT STATED HISTORY: (As transcribed by Technologist)  Wilner states he was bit in the right hand last night by his wife's dog. Patient has pain, redness and swelling to right posterior hand over distal 2nd and 3rd metacarpals.     FINDINGS:  No acute osseous injury/fractures.  There is mild soft tissue swelling and subtle foci of soft tissue air along the medial aspect of the 2nd MCP joint.  No radiopaque foreign bodies.   Joint spaces are preserved.    Impression  CONCLUSION:    1. Soft tissue swelling and subtle foci of soft tissue air along the medial aspect of the 2nd MCP joint.  2. No osseous abnormalities.       LOCATION:  [facility]    Dictated by (CST): Lara Urias DO on 6/30/2024 at 10:18 AM      Finalized by (CST): Lara Urias DO on 6/30/2024 at 10:19 AM     ASSESSMENT:  Right hand cellulitis/ R dorsal hand infection after dog bite   Concern for tenosynovitis and joint involvement, s/p I&D on 7/3- noted to have tenosynovitis. No ag purulence at the time of arthrotomy but felt to have some synovitis. 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 Discussed recommendation and reason for recommendation extensively with the patient again today. He is agreeable at this time for a PICC line/IV abx on dc   - consult to vascular access placed       [facility]

## 2024-07-06 NOTE — PROGRESS NOTES
[facility] Progress Note    Subjective: Patient is comfortable, pain is well-controlled soft wrap on the right hand/wrist.    Objective: Able to demonstrate minimal range of motion of the digits but sensations intact to light touch.  Dorsal wrist wound is well-appearing without significant erythema or swelling.  Nylon sutures are in place appropriately.    Incisional site is clean and dry with dressing in place.   Neurovascular examination intact distally.         Labs: Not applicable      Assessment/Plan: 43 year old male postop day #2 status post right wrist dorsal irrigation and debridement with Dr. Wilfredo Mosqueda on 7/3/2024.  Progressing appropriately without concerns.  Patient is amenable to discharge home based on antibiotics/infectious disease plan.      - Weight Bearing:  no lifting, pushing, pulling, or carrying greater than 1-2 lbs with right hand  - Activity: any activity as tolerated within weight bearing restrictions. HEP.  - Wound Care: daily dressing changes with gauze and ace wrap. Keep incision dry - do not get wet until sutures are removed in clinic  - Antibiotics: per infectious disease  - Follow up: 12-14 days post op for sure removal  - Plan: okay to discharge from Orthopedic standpoint    Patient will start out patient occupation therapy next week before follow up visit. Therapy office to call patient to coordinate therapy visits.              Joey Jasmine MD  Knee, Shoulder, & Elbow Surgery / Sports Medicine Specialist  Orthopaedic Surgery  [address] [place] [address] [id number] [place], [place] [email or web address]  [email or web address]  t: [phone]  o: [phone]  f: [phone]

## 2024-07-07 PROCEDURE — 99232 SBSQ HOSP IP/OBS MODERATE 35: CPT | Performed by: HOSPITALIST

## 2024-07-07 NOTE — PROGRESS NOTES
Alert and Oriented x 4.  VSS  Afebrile  Denies nausea or emesis.  Tolerating Regular diet.   C/O pain to right hand rating it 5/10.  Medicated with Xanax and  Norco with good relief of pain.  Right hand with dressing and ace wrap over clean, dry and intact.  Reminded patient to not get right hand wet. Patient verbalizes understanding.   IV Unasyn continued per orders.  Patient tolerated it well.     Up in room/bathroom independently without difficulty. Voiding in good amounts.

## 2024-07-07 NOTE — PROGRESS NOTES
[facility]   part of [facility]     Hospitalist Progress Note     Moo Cantor Patient Status:  Inpatient    10/8/1980 MRN [id number]   Location [facility] 3NW-A Attending Yakov Kaba MD   Hosp Day # 6 PCP None Pcp     Chief Complaint: Right hand redness/swelling    Subjective:     Pain improved.  Denies fever, chills, n/v.  No other acute complaints.      Objective:    Review of Systems:   A comprehensive review of systems was completed; pertinent positive and negatives stated in subjective.    Vital signs:  Temp:  [97.7 °F (36.5 °C)-97.8 °F (36.6 °C)] 97.7 °F (36.5 °C)  Pulse:  [54-63] 54  Resp:  [18] 18  BP: (101-123)/(52-77) 101/52  SpO2:  [94 %-99 %] 94 %    Physical Exam:    General: No acute distress, pleasant   Respiratory: No wheezes, no rhonchi  Cardiovascular: S1, S2, regular rate and rhythm  Abdomen: Soft, Non-tender, non-distended, positive bowel sounds  Neuro: No new focal deficits.   Extremities: No edema.  Right hand with dressing in place, wound site c/d/i    Diagnostic Data:    Labs:  Recent Labs   Lab 24  1112 24  0809   WBC 10.5 10.6   HGB 17.8* 16.8   MCV 87.1 88.4   .0 283.0       Recent Labs   Lab 24  1112 24  0809   * 113*   BUN 17 18   CREATSERUM 0.89 1.04   CA 9.2 9.0   ALB 4.1  --     139   K 4.2 4.7    108   CO2 23.0 28.0   ALKPHO 103  --    AST 15  --    ALT 37  --    BILT 0.8  --    TP 7.9  --        Estimated Creatinine Clearance: 105.4 mL/min (based on SCr of 1.04 mg/dL).    No results for input(s): \"TROP\", \"TROPHS\", \"CK\" in the last 168 hours.    No results for input(s): \"PTP\", \"INR\" in the last 168 hours.               Microbiology    Hospital Encounter on 24   1. Tissue Aerobic Culture     Status: None    Collection Time: 24  9:07 PM    Specimen: Hand, right; Tissue   Result Value Ref Range    Tissue Culture Result No Growth 3 Days N/A    Tissue Smear 1+ WBCs seen N/A    Tissue Smear No organisms  seen N/A   2. Anaerobic Culture     Status: None (Preliminary result)    Collection Time: 07/03/24  9:07 PM    Specimen: Hand, right; Tissue   Result Value Ref Range    Anaerobic Culture No Anaerobes to date N/A   3. Blood Culture     Status: None    Collection Time: 07/01/24 11:12 AM    Specimen: Blood,peripheral   Result Value Ref Range    Blood Culture Result No Growth 5 Days N/A         Imaging: Reviewed in Epic.    Medications:    enoxaparin  40 mg Subcutaneous Daily    ampicillin-sulbactam  3 g Intravenous Q6H       Assessment & Plan:      #Right hand dog bite cellulitis with concern for tenosynovitis  S/p right hand I&D, 3rd digit ECD tenosynovectomy, MCP I&D - 7/3  Hand surgery, ID consult  Continue Unasyn  PICC line ordered  Likely discharge tomorrow with 4 weeks total IV abx  Pain control  F/u cultures - NGTD    #Tobacco use   Nicotine replacement gum    Discussed POC with wife    Yakov Kaba MD    Supplementary Documentation:     Quality:  DVT Mechanical Prophylaxis:     Early ambuation  DVT Pharmacologic Prophylaxis   Medication    enoxaparin (Lovenox) 40 MG/0.4ML SUBQ injection 40 mg                Code Status: Not on file  Cho: No urinary catheter in place  Cho Duration (in days):   Central line:    ASHLEY: 7/8/2024    Discharge is dependent on: clinical recovery   At this point Mr. Cantor is expected to be discharge to: Home    The 21st Century Cures Act makes medical notes like these available to patients in the interest of transparency. Please be advised this is a medical document. Medical documents are intended to carry relevant information, facts as evident, and the clinical opinion of the practitioner. The medical note is intended as peer to peer communication and may appear blunt or direct. It is written in medical language and may contain abbreviations or verbiage that are unfamiliar.

## 2024-07-07 NOTE — PLAN OF CARE
Alert and oriented x4. Right hand with gauze, ace wrap, pain managed with PRN Norco. Denies paresthesia, full sensation distal and proximal to wound, <3 second capillary refill, limited movement to extremity but able to mobilize all fingers. Vital signs stable, afebrile, denies chest pain, dyspnea, lightheadedness. IV Unasyn per order, tolerating well, otherwise saline locked.     Bowel movement yesterday. Voiding up ad dixon.

## 2024-07-08 ENCOUNTER — TELEPHONE (OUTPATIENT)
Dept: ORTHOPEDICS CLINIC | Facility: CLINIC | Age: 44
End: 2024-07-08

## 2024-07-08 ENCOUNTER — APPOINTMENT (OUTPATIENT)
Dept: GENERAL RADIOLOGY | Facility: HOSPITAL | Age: 44
End: 2024-07-08
Attending: INTERNAL MEDICINE
Payer: COMMERCIAL

## 2024-07-08 ENCOUNTER — APPOINTMENT (OUTPATIENT)
Facility: HOSPITAL | Age: 44
End: 2024-07-08
Attending: PHYSICIAN ASSISTANT
Payer: COMMERCIAL

## 2024-07-08 VITALS
WEIGHT: 190 LBS | OXYGEN SATURATION: 100 % | DIASTOLIC BLOOD PRESSURE: 76 MMHG | HEART RATE: 65 BPM | TEMPERATURE: 98 F | RESPIRATION RATE: 20 BRPM | HEIGHT: 74 IN | SYSTOLIC BLOOD PRESSURE: 119 MMHG | BODY MASS INDEX: 24.38 KG/M2

## 2024-07-08 PROCEDURE — 71045 X-RAY EXAM CHEST 1 VIEW: CPT | Performed by: INTERNAL MEDICINE

## 2024-07-08 PROCEDURE — 99239 HOSP IP/OBS DSCHRG MGMT >30: CPT | Performed by: HOSPITALIST

## 2024-07-08 PROCEDURE — 02HV33Z INSERTION OF INFUSION DEVICE INTO SUPERIOR VENA CAVA, PERCUTANEOUS APPROACH: ICD-10-PCS | Performed by: HOSPITALIST

## 2024-07-08 RX ORDER — CEFTRIAXONE SODIUM 2 G/50ML
2 INJECTION, SOLUTION INTRAVENOUS EVERY 24 HOURS
Qty: 1200 ML | Refills: 0 | Status: SHIPPED | OUTPATIENT
Start: 2024-07-08 | End: 2024-08-01

## 2024-07-08 RX ORDER — ALPRAZOLAM 0.25 MG/1
0.25 TABLET ORAL 2 TIMES DAILY PRN
Qty: 10 TABLET | Refills: 0 | Status: SHIPPED | OUTPATIENT
Start: 2024-07-08 | End: 2024-07-11

## 2024-07-08 RX ORDER — HYDROCODONE BITARTRATE AND ACETAMINOPHEN 5; 325 MG/1; MG/1
1 TABLET ORAL EVERY 6 HOURS PRN
Qty: 10 TABLET | Refills: 0 | Status: SHIPPED | OUTPATIENT
Start: 2024-07-08 | End: 2024-07-11

## 2024-07-08 RX ORDER — CEFTRIAXONE SODIUM 2 G/50ML
2 INJECTION, SOLUTION INTRAVENOUS EVERY 24 HOURS
Qty: 1200 ML | Refills: 0 | Status: SHIPPED | OUTPATIENT
Start: 2024-07-08 | End: 2024-07-08

## 2024-07-08 RX ORDER — ALPRAZOLAM 0.25 MG/1
0.25 TABLET ORAL ONCE
Status: COMPLETED | OUTPATIENT
Start: 2024-07-08 | End: 2024-07-08

## 2024-07-08 RX ORDER — LIDOCAINE HYDROCHLORIDE 10 MG/ML
5 INJECTION, SOLUTION EPIDURAL; INFILTRATION; INTRACAUDAL; PERINEURAL
Status: COMPLETED | OUTPATIENT
Start: 2024-07-08 | End: 2024-07-08

## 2024-07-08 NOTE — PAYOR COMM NOTE
--------------  CONTINUED STAY REVIEW----REQUESTING ADDITIONAL DAYS 7/6 7/7       Payor: KURT GILMORE  Subscriber #:  [id number]  Authorization Number: [id number]    Admit date: 7/1/24  Admit time:  1:46 PM    7/6   Chief Complaint: Right hand redness/swelling     Subjective:      Pain is better.  Denies fever, chills, n/v.  No other acute complaints .      Objective:    Review of Systems:   A comprehensive review of systems was completed; pertinent positive and negatives stated in subjective.     Vital signs:  Temp:  [97.7 °F (36.5 °C)-98 °F (36.7 °C)] 97.7 °F (36.5 °C)  Pulse:  [57-64] 57  Resp:  [18] 18  BP: (116-167)/(73-86) 116/73  SpO2:  [98 %-100 %] 99 %     Physical Exam:    General: No acute distress, pleasant   Respiratory: No wheezes, no rhonchi  Cardiovascular: S1, S2, regular rate and rhythm  Abdomen: Soft, Non-tender, non-distended, positive bowel sounds  Neuro: No new focal deficits.   Extremities: No edema.  Right hand with dressing in place, wound site c/d/i     Diagnostic Data:    Labs:       Recent Labs   Lab 07/01/24  1112 07/04/24  0809   WBC 10.5 10.6   HGB 17.8* 16.8   MCV 87.1 88.4   .0 283.0              Recent Labs   Lab 07/01/24  1112 07/04/24  0809   * 113*   BUN 17 18   CREATSERUM 0.89 1.04   CA 9.2 9.0   ALB 4.1  --     139   K 4.2 4.7    108   CO2 23.0 28.0   ALKPHO 103  --    AST 15  --    ALT 37  --    BILT 0.8  --    TP 7.9  --                 Microbiology           Hospital Encounter on 07/01/24   1. Tissue Aerobic Culture     Status: None (Preliminary result)     Collection Time: 07/03/24  9:07 PM     Specimen: Hand, right; Tissue   Result Value Ref Range     Tissue Culture Result No Growth 2 Days N/A     Tissue Smear 1+ WBCs seen N/A     Tissue Smear No organisms seen N/A   2. Anaerobic Culture     Status: None (Preliminary result)     Collection Time: 07/03/24  9:07 PM     Specimen: Hand, right; Tissue   Result Value Ref Range     Anaerobic Culture  Pending N/A   3. Blood Culture     Status: None (Preliminary result)     Collection Time: 07/01/24 11:12 AM     Specimen: Blood,peripheral   Result Value Ref Range     Blood Culture Result No Growth 4 Days N/A            Imaging: Reviewed in Epic.     Medications:    enoxaparin  40 mg Subcutaneous Daily    ampicillin-sulbactam  3 g Intravenous Q6H         Assessment & Plan:       #Right hand dog bite cellulitis with concern for tenosynovitis  S/p right hand I&D, 3rd digit ECD tenosynovectomy, MCP I&D - 7/3  Hand surgery, ID consult  Continue Unasyn - PICC? 4 weeks abx.   Patient more agreeable at this time and wants to speak with ID again  Pain control  F/u cultures - NGTD     #Tobacco use   Nicotine replacement gum        Yakov Kaba MD       7/7   Chief Complaint: Right hand redness/swelling     Subjective:      Pain improved.  Denies fever, chills, n/v.  No other acute complaints.       Objective:    Review of Systems:   A comprehensive review of systems was completed; pertinent positive and negatives stated in subjective.     Vital signs:  Temp:  [97.7 °F (36.5 °C)-97.8 °F (36.6 °C)] 97.7 °F (36.5 °C)  Pulse:  [54-63] 54  Resp:  [18] 18  BP: (101-123)/(52-77) 101/52  SpO2:  [94 %-99 %] 94 %     Physical Exam:    General: No acute distress, pleasant   Respiratory: No wheezes, no rhonchi  Cardiovascular: S1, S2, regular rate and rhythm  Abdomen: Soft, Non-tender, non-distended, positive bowel sounds  Neuro: No new focal deficits.   Extremities: No edema.  Right hand with dressing in place, wound site c/d/i     Diagnostic Data:    Labs:       Recent Labs   Lab 07/01/24  1112 07/04/24  0809   WBC 10.5 10.6   HGB 17.8* 16.8   MCV 87.1 88.4   .0 283.0              Recent Labs   Lab 07/01/24  1112 07/04/24  0809   * 113*   BUN 17 18   CREATSERUM 0.89 1.04   CA 9.2 9.0   ALB 4.1  --     139   K 4.2 4.7    108   CO2 23.0 28.0   ALKPHO 103  --    AST 15  --    ALT 37  --    BILT 0.8  --    TP 7.9   --                    Microbiology           Hospital Encounter on 07/01/24   1. Tissue Aerobic Culture     Status: None     Collection Time: 07/03/24  9:07 PM     Specimen: Hand, right; Tissue   Result Value Ref Range     Tissue Culture Result No Growth 3 Days N/A     Tissue Smear 1+ WBCs seen N/A     Tissue Smear No organisms seen N/A   2. Anaerobic Culture     Status: None (Preliminary result)     Collection Time: 07/03/24  9:07 PM     Specimen: Hand, right; Tissue   Result Value Ref Range     Anaerobic Culture No Anaerobes to date N/A   3. Blood Culture     Status: None     Collection Time: 07/01/24 11:12 AM     Specimen: Blood,peripheral   Result Value Ref Range     Blood Culture Result No Growth 5 Days N/A            Imaging: Reviewed in Epic.     Medications:    enoxaparin  40 mg Subcutaneous Daily    ampicillin-sulbactam  3 g Intravenous Q6H         Assessment & Plan:       #Right hand dog bite cellulitis with concern for tenosynovitis  S/p right hand I&D, 3rd digit ECD tenosynovectomy, MCP I&D - 7/3  Hand surgery, ID consult  Continue Unasyn  PICC line ordered  Likely discharge tomorrow with 4 weeks total IV abx  Pain control  F/u cultures - NGTD     #Tobacco use   Nicotine replacement gum     Discussed POC with wife     Yakov Kaba MD        MEDICATIONS ADMINISTERED IN LAST 1 DAY:  ALPRAZolam (Xanax) tab 0.5 mg       Date Action Dose Route User    7/8/2024 0921 Given 0.5 mg Oral Kaila Davis RN    7/7/2024 1233 Given 0.5 mg Oral Kierra Grijalva RN          ampicillin-sulbactam (Unasyn) 3 g in sodium chloride 0.9% 100mL IVPB-ADD       Date Action Dose Route User    7/8/2024 0518 New Bag 3 g Intravenous Eben Renae RN    7/7/2024 2325 New Bag 3 g Intravenous Eben Renae RN    7/7/2024 1820 New Bag 3 g Intravenous Kierra Grijalva RN    7/7/2024 1234 New Bag 3 g Intravenous Kierra Grijalva RN          enoxaparin (Lovenox) 40 MG/0.4ML SUBQ injection 40 mg       Date Action Dose  Route User    7/7/2024 1243 Given 40 mg Subcutaneous (Left Lower Abdomen) Kierra Grijalva RN          HYDROcodone-acetaminophen (Norco) 5-325 MG per tab 1 tablet       Date Action Dose Route User    7/7/2024 2156 Given 1 tablet Oral Gianna Melo RN    7/7/2024 1822 Given 1 tablet Oral Kierra Grijalva RN    7/7/2024 1232 Given 1 tablet Oral Kierra Grijalva RN          HYDROcodone-acetaminophen (Norco) 5-325 MG per tab 2 tablet       Date Action Dose Route User    7/8/2024 0920 Given 2 tablet Oral Kaila Davis RN    7/8/2024 0518 Given 2 tablet Oral Eben Renae RN          lidocaine PF (Xylocaine-MPF) 1% injection       Date Action Dose Route User    7/8/2024 1110 Given 5 mL Intradermal Moo Degroot RN            Vitals (last day)       Date/Time Temp Pulse Resp BP SpO2 Weight O2 Device O2 Flow Rate (L/min) Who    07/08/24 1203 97.9 °F (36.6 °C) 66 20 128/75 100 % -- None (Room air) -- LE    07/08/24 0527 97.7 °F (36.5 °C) 58 20 114/76 100 % -- None (Room air) -- MV    07/07/24 2151 98.6 °F (37 °C) 75 18 125/67 96 % -- None (Room air) -- MQ    07/07/24 0525 97.7 °F (36.5 °C) 54 18 101/52 94 % -- None (Room air) 0 L/min DI

## 2024-07-08 NOTE — DISCHARGE SUMMARY
[facility]  DISCHARGE SUMMARY     Moo Cantor Patient Status:  Inpatient    10/8/1980 MRN [id number]   Location [facility] 3NW-A Attending Yakov Kaba MD   Hosp Day # 7 PCP None Pcp     Date of Admission: 2024  Date of Discharge:   2024    Discharge Disposition: [facility] Emergency Room    Discharge Diagnosis:    #Right hand dog bite   #Cellulitis and tenosynovitis  #Tobacco use     History of Present Illness: Moo Cantor is a 43 year old male with no significant PMH presented to ED due to redness, swelling, and pain of right hand following being bit by dog on Saturday. Patient went to immediate care on Saturday, was given dose of IV Unasyn and sent home with Augmentin. Despite taking antibiotics, presenting symptoms have worsened. Patient reports pain with movement of fingers and wrist and inability to make fist. Patient did notice some purulent drainage from bite site which has since resolved. Denies fevers/chills. Denies CP/SOB/N/V.     Brief Synopsis:   Patient presented to the ER after a dog bite to his left hand.  Admitted for pain control, IV abx.  Patient underwent an I&D with Ortho on 7.3.  Will need PICC and 4 wks IV abx on discharge.  Patient initially refused PICC and wanted home oral antibiotics.  His cultures have been no growth, now agreeable to plan.  F/u with hand surgeon after discharge.  All questions addressed, patient agreeable to plan of care as stated.      Lace+ Score: 56  59-90 High Risk  29-58 Medium Risk  0-28   Low Risk  Patient was referred to the [facility].    TCM Follow-Up Recommendation:  LACE 29-58: Moderate Risk of readmission after discharge from the hospital.      Procedures during hospitalization:   right hand I&D, 3rd digit ECD tenosynovectomy, MCP I&D - 7/3    Lab/Test results pending at Discharge:   none    Consultants:  ID, Hand surgeon     Discharge Medication List:     Discharge Medications        START taking these  medications        Instructions Prescription details   cefTRIAXone in dextrose 5% IVPB premix  Commonly known as: Rocephin      Inject 50 mL (2 g total) into the vein daily for 24 days. Will need weekly labs with CBC, BMP, ESR and CRP while on this medication.   Stop taking on: August 1, 2024  Quantity: 1200 mL  Refills: 0     HYDROcodone-acetaminophen 5-325 MG Tabs  Commonly known as: Norco      Take 1 tablet by mouth every 6 (six) hours as needed.   Quantity: 10 tablet  Refills: 0            STOP taking these medications      amoxicillin clavulanate 875-125 MG Tabs  Commonly known as: Augmentin                  Where to Get Your Medications        These medications were sent to [id number] - [place], [place] - [address] AT [facility] OF [address], [phone], [phone] [address], [facility] [address]      Phone: [phone]   HYDROcodone-acetaminophen 5-325 MG Tabs       Please  your prescriptions at the location directed by your doctor or nurse    Bring a paper prescription for each of these medications  cefTRIAXone in dextrose 5% IVPB premix         [place]    Follow-up appointment:   [facility]  [address] [place] [address] [address]  [facility] [address] [phone]  Schedule an appointment as soon as possible for a visit      Evonne Aleman MD  [address] [facility] [address]  [facility] [address]  [phone]    Schedule an appointment as soon as possible for a visit in 2 week(s)  Please call to make an appointment to be seen in 2-3 weeks.    Appointments for Next 30 Days 7/8/2024 - 8/7/2024      None            Vital signs:  Temp:  [97.7 °F (36.5 °C)-98.6 °F (37 °C)] 97.9 °F (36.6 °C)  Pulse:  [58-75] 66  Resp:  [18-20] 20  BP: (114-128)/(67-76) 128/75  SpO2:  [96 %-100 %] 100 %    Physical Exam:    General: No acute distress, pleasant    Lungs: clear to auscultation  Cardiovascular: S1, S2, RRR  Abdomen: Soft, NT, ND  Extremities: Right hand with dressing  in place, wound site c/d/i     -----------------------------------------------------------------------------------------------  PATIENT DISCHARGE INSTRUCTIONS: See electronic chart    Yakov Kaba MD    Total time spent on discharge plannin minutes     The  Cent[employer] Cures Act makes medical notes like these available to patients in the interest of transparency. Please be advised this is a medical document. Medical documents are intended to carry relevant information, facts as evident, and the clinical opinion of the practitioner. The medical note is intended as peer to peer communication and may appear blunt or direct. It is written in medical language and may contain abbreviations or verbiage that are unfamiliar.

## 2024-07-08 NOTE — PROGRESS NOTES
[facility]   part of [facility] ID PROGRESS NOTE    Moo Cantor Patient Status:  Observation    10/8/1980 MRN [id number]   Location [facility] 3NW-A Attending Pawan Eng DO   Hosp Day # 7 PCP None Pcp     Abx: IV Unasyn (-->) D#8, s/p IV vanco, POD #5    Subjective: Patient seen and examined this this morning. Complaining of pain to his R hand. No new issues. Afebrile. On room air.     Allergies:  Allergies   Allergen Reactions    Vancomycin FACE FLUSHING     Redness and itching on scalp,face ,neck and upper chest area       Medications:    Current Facility-Administered Medications:     melatonin tab 3 mg, 3 mg, Oral, Nightly PRN    enoxaparin (Lovenox) 40 MG/0.4ML SUBQ injection 40 mg, 40 mg, Subcutaneous, Daily    morphINE PF 2 MG/ML injection 1 mg, 1 mg, Intravenous, Q2H PRN **OR** morphINE PF 2 MG/ML injection 2 mg, 2 mg, Intravenous, Q2H PRN **OR** morphINE PF 4 MG/ML injection 4 mg, 4 mg, Intravenous, Q2H PRN    acetaminophen (Tylenol) tab 650 mg, 650 mg, Oral, Q4H PRN **OR** HYDROcodone-acetaminophen (Norco) 5-325 MG per tab 1 tablet, 1 tablet, Oral, Q4H PRN **OR** HYDROcodone-acetaminophen (Norco) 5-325 MG per tab 2 tablet, 2 tablet, Oral, Q4H PRN    acetaminophen (Tylenol Extra Strength) tab 500 mg, 500 mg, Oral, Q4H PRN    ondansetron (Zofran) 4 MG/2ML injection 4 mg, 4 mg, Intravenous, Q6H PRN    prochlorperazine (Compazine) 10 MG/2ML injection 5 mg, 5 mg, Intravenous, Q8H PRN    polyethylene glycol (PEG 3350) (Miralax) 17 g oral packet 17 g, 17 g, Oral, Daily PRN    sennosides (Senokot) tab 17.2 mg, 17.2 mg, Oral, Nightly PRN    bisacodyl (Dulcolax) 10 MG rectal suppository 10 mg, 10 mg, Rectal, Daily PRN    fleet enema (Fleet) 7-19 GM/118ML rectal enema 133 mL, 1 enema, Rectal, Once PRN    ampicillin-sulbactam (Unasyn) 3 g in sodium chloride 0.9% 100mL IVPB-ADD, 3 g, Intravenous, Q6H    diphenhydrAMINE (Benadryl) 50 mg/mL  injection 25 mg, 25 mg, Intravenous, Q6H  PRN    ALPRAZolam (Xanax) tab 0.5 mg, 0.5 mg, Oral, BID PRN    Review of Systems:  Completed. See pertinent positives and negatives above.     Physical Exam:  Vital signs: Blood pressure 114/76, pulse 58, temperature 97.7 °F (36.5 °C), temperature source Oral, resp. rate 20, height 188 cm (6' 2\"), weight 190 lb (86.2 kg), SpO2 100%.    General: Alert, oriented, NAD, on room air.   HEENT: Moist mucous membranes.   Neck: No lymphadenopathy.  Supple.  Respiratory: Non-labored breathing.  Abdomen: Nondistended.   Musculoskeletal: Difficulty moving 3rd digit.  Integument: R hand edema, erythema significantly improved. Sutures in place without any drainage. See picture below.          Laboratory Data:  Recent Labs   Lab 07/01/24  1112 07/04/24  0809   RBC 5.87* 5.45   HGB 17.8* 16.8   HCT 51.1 48.2   MCV 87.1 88.4   MCH 30.3 30.8   MCHC 34.8 34.9   RDW 11.8 11.8   NEPRELIM 8.23*  --    WBC 10.5 10.6   .0 283.0     Recent Labs   Lab 07/01/24  1112 07/04/24  0809   * 113*   BUN 17 18   CREATSERUM 0.89 1.04   CA 9.2 9.0   ALB 4.1  --     139   K 4.2 4.7    108   CO2 23.0 28.0   ALKPHO 103  --    AST 15  --    ALT 37  --    BILT 0.8  --    TP 7.9  --        Microbiology: Reviewed in EMR    Radiology: Reviewed.    Narrative  PROCEDURE:  XR HAND (MIN 3 VIEWS), RIGHT (CPT=73130)     TECHNIQUE:  Three views of the right hand were obtained.     COMPARISON:  None.     INDICATIONS:  dog bite to the right hand with pain, redness and swelling over the 2nd and 3rd metacarpals.     PATIENT STATED HISTORY: (As transcribed by Technologist)  Wilner states he was bit in the right hand last night by his wife's dog. Patient has pain, redness and swelling to right posterior hand over distal 2nd and 3rd metacarpals.     FINDINGS:  No acute osseous injury/fractures.  There is mild soft tissue swelling and subtle foci of soft tissue air along the medial aspect of the 2nd MCP joint.  No radiopaque foreign bodies.  Joint  spaces are preserved.    Impression  CONCLUSION:    1. Soft tissue swelling and subtle foci of soft tissue air along the medial aspect of the 2nd MCP joint.  2. No osseous abnormalities.       LOCATION:  [facility]    Dictated by (CST): Lara Urias DO on 6/30/2024 at 10:18 AM      Finalized by (CST): Lara Urias DO on 6/30/2024 at 10:19 AM     ASSESSMENT:  Right hand cellulitis/ R dorsal hand infection after dog bite- improving.   Concern for tenosynovitis and joint involvement, s/p I&D on 7/3- noted to have tenosynovitis. No ag purulence at the time of arthrotomy but felt to have some synovitis. Cxs ngtd (however, done after abx)  Blood cultures negative.  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 Ok for dc planning later once today, once abx arranged and when cleared by all services.     Discussed case with RN, CM, Dr. Aleman and patient    Kim Valverde PA-C    ID ATTENDING ADDENDUM     Pt seen an examined independently. Chart reviewed. Agree with above. Note has been reviewed by me and modified as needed.  Exam and Impression/ Recs as noted above.  Pt c/o palpitations at the time of my eval. Very anxious re picc being \"in the wrong place\". Was in tele at the time, NSR in monitor, nl HR. Had one PVC per RN. CXR ordered, vascular access team contacted per RN.  D/w staff and with pt  More than 50% of clinical time and 100% of the clinical decision making performed by me.    Evonne Aleman MD

## 2024-07-08 NOTE — CM/SW NOTE
Soke with Alyssa @ [facility]--infusion packet sent in [facility]IDIN--await final abx script from ID--per earlier  note, patient approved for in office or home administration--patient currently waiting for PICC--per Meghana with ID--rocephin to be prescribed--will follow up with patient as to his choice--office or infusion    Call received from Alyssa with [facility]--patient to get 2 gms of rocephin daily for 4 weeks--provided PICC information and opted to do the teach/train option of administration.  Per Alyssa, patient has 11:30 am appt @ [facility] Office [address] [place] [address]  Phone  [phone]--will place on AVS as well    Spoke with Alyssa @ [facility]--patient now prefers home with [facility]--[facility] to be C provider.  Start of care 7/9-/2024--[facility] to deliver IV abxs tomorrow as well--AVS updated    Spoke with patient--he is also aware.  Nurse to fax AVS to [facility] when completed

## 2024-07-08 NOTE — PLAN OF CARE
Patient is alert and oriented. On room air. Patient states having BM's. Voiding freely. Swelling is more significant today compared to yesterday per patient. Dr. Mosqueda aware. Patient c/o 5/10 pain. Norco given. Patient denies numbness or tingling. Radial pulse is strong. Saline locked. Patient c/o heart palpitations when moving left arm across the chest or getting up out of bed. Tele placed. PVC noted on tele. Dr. Aleman made aware. Orders received for CXR. Tip of picc line in the correct placement of the superior vena cava. Patient aware of results.

## 2024-07-08 NOTE — PROGRESS NOTES
NURSING DISCHARGE NOTE    Discharged Home via Ambulatory.  Accompanied by Spouse  Belongings Taken by patient/family.    Discharge instructions given to patient and wife at bedside. Both verbalized understanding. Dose of IV rocephin given to patient before discharge. No reactions noted. Picc line flushed appropriately with blood return noted. Gauze and tegaderm given to patient. Patient refused wheelchair transport. Patient left unit in stable condition.

## 2024-07-08 NOTE — PLAN OF CARE
Alert and oriented x4. Right hand dressing changed, gauze and ace wrap, no drainage noted. Redness and swelling improving to site. Pain controlled with 1 Norco PRN. Pulses present, denies paresthesia, limited mobility to hand. Normotensive, afebrile.  Plan for PICC line placement this morning. IV Unasyn per order, S/L between administrations, tolerating well.

## 2024-07-09 ENCOUNTER — TELEPHONE (OUTPATIENT)
Dept: PHYSICAL THERAPY | Facility: HOSPITAL | Age: 44
End: 2024-07-09

## 2024-07-09 ENCOUNTER — TELEPHONE (OUTPATIENT)
Dept: ORTHOPEDICS CLINIC | Facility: CLINIC | Age: 44
End: 2024-07-09

## 2024-07-09 ENCOUNTER — PATIENT OUTREACH (OUTPATIENT)
Dept: CASE MANAGEMENT | Age: 44
End: 2024-07-09

## 2024-07-09 DIAGNOSIS — S61.451A DOG BITE OF RIGHT HAND, INITIAL ENCOUNTER: Primary | ICD-10-CM
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
## 2024-07-09 NOTE — TELEPHONE ENCOUNTER
Spoke with patient to let him know we can fax the occupational therapy order to his home health agency so they can begin occupational therapy.  Patient has a home health nurse to manage his PICC line for about 3 weeks.  Patient stated its is [facility].  Spoke with Janie at [facility] who stated they can definitely do the occupational therapy, the order was faxed to [phone].

## 2024-07-09 NOTE — PAYOR COMM NOTE
--------------  DISCHARGE REVIEW    Payor: [facility]  Subscriber #:  [id number]  Authorization Number: [id number]    Admit date: 24  Admit time:   1:46 PM  Discharge Date: 2024  5:39 PM     Admitting Physician: Pawan Eng DO  Attending Physician:  No att. providers found  Primary Care Physician: Pcp, None          Discharge Summary Notes        Discharge Summary signed by Yakov Kaba MD at 2024 12:21 PM       Author: Yakov Kaba MD Specialty: HOSPITALIST Author Type: Physician    Filed: 2024 12:21 PM Date of Service: 2024 12:13 PM Status: Signed    : Yakov Kaba MD (Physician)           [facility]  DISCHARGE SUMMARY     Moo Cantor Patient Status:  Inpatient    10/8/1980 MRN [id number]   [facility] 3NW-A Attending Yakov Kaba MD   Hosp Day # 7 PCP None Pcp     Date of Admission: 2024  Date of Discharge:   2024    Discharge Disposition: [facility] Emergency Room    Discharge Diagnosis:    #Right hand dog bite   #Cellulitis and tenosynovitis  #Tobacco use     History of Present Illness: Moo Cantor is a 43 year old male with no significant PMH presented to ED due to redness, swelling, and pain of right hand following being bit by dog on Saturday. Patient went to immediate care on Saturday, was given dose of IV Unasyn and sent home with Augmentin. Despite taking antibiotics, presenting symptoms have worsened. Patient reports pain with movement of fingers and wrist and inability to make fist. Patient did notice some purulent drainage from bite site which has since resolved. Denies fevers/chills. Denies CP/SOB/N/V.     Brief Synopsis:   Patient presented to the ER after a dog bite to his left hand.  Admitted for pain control, IV abx.  Patient underwent an I&D with Ortho on .3.  Will need PICC and 4 wks IV abx on discharge.  Patient initially refused PICC and wanted home oral antibiotics.  His cultures have been no growth, now agreeable to  plan.  F/u with hand surgeon after discharge.  All questions addressed, patient agreeable to plan of care as stated.      Lace+ Score: 56  59-90 High Risk  29-58 Medium Risk  0-28   Low Risk  Patient was referred to the [facility].    TCM Follow-Up Recommendation:  LACE 29-58: Moderate Risk of readmission after discharge from the hospital.      Procedures during hospitalization:   right hand I&D, 3rd digit ECD tenosynovectomy, MCP I&D - 7/3    Lab/Test results pending at Discharge:   none    Consultants:  ID, Hand surgeon     Discharge Medication List:     Discharge Medications        START taking these medications        Instructions Prescription details   cefTRIAXone in dextrose 5% IVPB premix  Commonly known as: Rocephin      Inject 50 mL (2 g total) into the vein daily for 24 days. Will need weekly labs with CBC, BMP, ESR and CRP while on this medication.   Stop taking on: August 1, 2024  Quantity: 1200 mL  Refills: 0     HYDROcodone-acetaminophen 5-325 MG Tabs  Commonly known as: Norco      Take 1 tablet by mouth every 6 (six) hours as needed.   Quantity: 10 tablet  Refills: 0            STOP taking these medications      amoxicillin clavulanate 875-125 MG Tabs  Commonly known as: Augmentin                  Where to Get Your Medications        These medications were sent to [id number] - [place], [place] - [address] [facility] [address], [phone], [phone]  [address] [facility] [address]      Phone: [phone]   HYDROcodone-acetaminophen 5-325 MG Tabs       Please  your prescriptions at the location directed by your doctor or nurse    Bring a paper prescription for each of these medications  cefTRIAXone in dextrose 5% IVPB premix         [place]    Follow-up appointment:   [facility]  Neeru Valenzuela [address] [place] [id number] [phone]  Schedule an appointment as soon as possible for a  visit      Evonne Aleman MD  [address] [facility] [address]  [facility] [address]  [phone]    Schedule an appointment as soon as possible for a visit in 2 week(s)  Please call to make an appointment to be seen in 2-3 weeks.    Appointments for Next 30 Days 2024 - 2024      None            Vital signs:  Temp:  [97.7 °F (36.5 °C)-98.6 °F (37 °C)] 97.9 °F (36.6 °C)  Pulse:  [58-75] 66  Resp:  [18-20] 20  BP: (114-128)/(67-76) 128/75  SpO2:  [96 %-100 %] 100 %    Physical Exam:    General: No acute distress, pleasant    Lungs: clear to auscultation  Cardiovascular: S1, S2, RRR  Abdomen: Soft, NT, ND  Extremities: Right hand with dressing in place, wound site c/d/i     -----------------------------------------------------------------------------------------------  PATIENT DISCHARGE INSTRUCTIONS: See electronic chart    Yakov Kaba MD    Total time spent on discharge plannin minutes     The  Cures Act makes medical notes like these available to patients in the interest of transparency. Please be advised this is a medical document. Medical documents are intended to carry relevant information, facts as evident, and the clinical opinion of the practitioner. The medical note is intended as peer to peer communication and may appear blunt or direct. It is written in medical language and may contain abbreviations or verbiage that are unfamiliar.       Electronically signed by Yakov Kaba MD on 2024 12:21 PM         REVIEWER COMMENTS

## 2024-07-09 NOTE — TELEPHONE ENCOUNTER
Patients wife called and stated she tried to schedule OT for the patient and she was told since he is receiving home health 1x a week OT will also not be covered by insurance and she needs to set up OT through home health. Please advise.

## 2024-07-10 ENCOUNTER — PATIENT OUTREACH (OUTPATIENT)
Dept: CASE MANAGEMENT | Age: 44
End: 2024-07-10

## 2024-07-11 ENCOUNTER — OFFICE VISIT (OUTPATIENT)
Dept: ORTHOPEDICS CLINIC | Facility: CLINIC | Age: 44
End: 2024-07-11
Payer: COMMERCIAL

## 2024-07-11 VITALS — BODY MASS INDEX: 23.1 KG/M2 | HEIGHT: 74 IN | WEIGHT: 180 LBS
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
## 2024-07-11 DIAGNOSIS — S61.451A DOG BITE, HAND, RIGHT, INITIAL ENCOUNTER: ICD-10-CM

## 2024-07-11 DIAGNOSIS — S61.451A DOG BITE OF RIGHT HAND, INITIAL ENCOUNTER: Primary | ICD-10-CM

## 2024-07-11 PROCEDURE — 99024 POSTOP FOLLOW-UP VISIT: CPT | Performed by: ORTHOPAEDIC SURGERY

## 2024-07-11 PROCEDURE — 3008F BODY MASS INDEX DOCD: CPT | Performed by: ORTHOPAEDIC SURGERY

## 2024-07-11 RX ORDER — ALPRAZOLAM 0.25 MG/1
0.25 TABLET ORAL 2 TIMES DAILY PRN
Qty: 20 TABLET | Refills: 0 | Status: SHIPPED | OUTPATIENT
Start: 2024-07-11

## 2024-07-11 RX ORDER — METHYLPREDNISOLONE 4 MG/1
TABLET ORAL
Qty: 1 EACH | Refills: 0 | Status: SHIPPED | OUTPATIENT
Start: 2024-07-11

## 2024-07-11 RX ORDER — HYDROCODONE BITARTRATE AND ACETAMINOPHEN 5; 325 MG/1; MG/1
1 TABLET ORAL EVERY 6 HOURS PRN
Qty: 20 TABLET | Refills: 0 | Status: SHIPPED | OUTPATIENT
Start: 2024-07-11

## 2024-07-11 NOTE — PROGRESS NOTES
Clinic Note     Assessment/Plan:  43 year old male    Status post irrigation debridement of right dorsal hand and middle finger MCP arthrotomy on 7/3/2024-patient is unable to outpatient therapy secondary to home health requirements for PICC line dressing.  We will start health OT.  Medrol Dosepak to help with the residual soft tissue swelling and inflammation.  Continue antibiotics.  Norco and Xanax prescription refilled.    Follow Up: 2 weeks for range of motion check    Diagnostic Studies:     None       Physical Exam:     Ht 6' 2\" (1.88 m)   Wt 180 lb (81.6 kg)   BMI 23.11 kg/m²     Constitutional: NAD. AOx3. Well-developed and Well-nourished.   Psychiatric: Normal mood/ affect/ behavior. Judgment and thought content normal.     Right Upper Extremity:     Inspection    Skin incision well-healed.  Decreased erythema and swelling compared to hospitalization   Palpation    Mild tenderness to palpation around the MCP and dorsal hand      ROM    10-15 degrees of extensor lag, passive range of motion of right middle and ring finger 0 to 60 degrees     Neurovascular    Normal sensation in the median, ulnar, and radial nerve distribution. Normal motor function of muscles innervated by median/AIN, ulnar, and radial/PIN nerves.    Normally perfused hand(s).          CC: Irrigation debridement status post dog bite    HPI: This 43 year old RHD male presents status post irrigation debridement secondary to a dog bite.  Pain and swelling has improved.  He still has significant difficulty with range of motion secondary to pain and stiffness.    Occupation:  for [employer]    History/Other:   History reviewed. No pertinent past medical history.  Past Surgical History:   Procedure Laterality Date    Other surgical history       Current Outpatient Medications   Medication Sig Dispense Refill    cefTRIAXone in dextrose 5% (ROCEPHIN) IVPB premix Inject 50 mL (2 g total) into the vein daily for 24 days. Will  need weekly labs with CBC, BMP, ESR and CRP while on this medication. 1200 mL 0    ALPRAZolam 0.25 MG Oral Tab Take 1 tablet (0.25 mg total) by mouth 2 (two) times daily as needed. 20 tablet 0    HYDROcodone-acetaminophen 5-325 MG Oral Tab Take 1 tablet by mouth every 6 (six) hours as needed. 20 tablet 0    methylPREDNISolone (MEDROL) 4 MG Oral Tablet Therapy Pack As directed. 1 each 0    methylPREDNISolone (MEDROL) 4 MG Oral Tablet Therapy Pack As directed. 1 each 0     Allergies   Allergen Reactions    Vancomycin FACE FLUSHING     Redness and itching on scalp,face ,neck and upper chest area     Family History   Problem Relation Age of Onset    Heart Disease Maternal Grandfather     Cancer Neg     Stroke Neg      Social History     Occupational History    Not on file   Tobacco Use    Smoking status: Former     Current packs/day: 0.75     Types: Cigarettes     Passive exposure: Never    Smokeless tobacco: Never   Substance and Sexual Activity    Alcohol use: Yes     Comment: 1-2 times a week    Drug use: Not Currently    Sexual activity: Not on file          Review of Systems (negative unless bolded):  General: fevers, chills, fatigue  CV:  chest pain, palpitations, leg swelling  Msk: bodyaches, neck pain, neck stiffness  Skin: rashes, open wounds, nonhealing ulcers  Hem: bleeds easily, bruise easily, immunocompromised  Neuro: dizziness, light headedness, headaches  Psych: anxious, depressed, anger issues      Wilfredo Mosqueda MD   Hand, Wrist, & Elbow Surgery  [email or web address]  t: [phone]  f: [phone]

## 2024-07-18 ENCOUNTER — TELEPHONE (OUTPATIENT)
Dept: PHYSICAL THERAPY | Facility: HOSPITAL | Age: 44
End: 2024-07-18

## 2024-07-19 ENCOUNTER — OFFICE VISIT (OUTPATIENT)
Dept: OCCUPATIONAL MEDICINE | Facility: HOSPITAL | Age: 44
End: 2024-07-19
Attending: ORTHOPAEDIC SURGERY
Payer: COMMERCIAL

## 2024-07-19 DIAGNOSIS — S61.451A DOG BITE OF RIGHT HAND, INITIAL ENCOUNTER: Primary | ICD-10-CM
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
## 2024-07-19 PROCEDURE — 97165 OT EVAL LOW COMPLEX 30 MIN: CPT

## 2024-07-19 PROCEDURE — 97110 THERAPEUTIC EXERCISES: CPT

## 2024-07-19 NOTE — PROGRESS NOTES
OCCUPATIONAL THERAPY UPPER EXTREMITY EVALUATION     Diagnosis:        Stiffness of right middle finger 719.54 Referring Provider: Wilfredo Mosqueda  Date of Evaluation:    7/19/2024    Precautions:  None Next MD visit:   none scheduled  Date of Surgery: n/a     Order received to Begin AROM/PROM. Light resistance. No WB restr  ictions once sutures removed. Number of Visits: 8, Ext - RFL, Routine, 8 visits    PATIENT SUMMARY   Moo Cantor is a 43 year old RHD male presents status post irrigation debridement secondary to a dog bite that occurred on June 27.2024  Pt describes pain level current 2/10, at best 0/10, at worst 3/10.   Current functional limitations include gripping, typing,writing, opening jars, lifting, painting cars, cutting food     Moo describes prior level of function Independent .   Employment: Working full duty as a    Hand Dominance: right  Living Situation: Family    Pt goals include to gain use of his right dominant hand .  Past medical history was reviewed with Moo. Significant findings include      has no past medical history on file.     ASSESSMENT  Moo presents to occupational therapy evaluation with primary c/o right hand pain.. The results of the objective tests and measures show significant swelling at the MCP joints and decreased digital ROM. .  Functional deficits include but are not limited to gripping, typing, writing, lifting, painting cars, cutting food.  Signs and symptoms are consistent with diagnosis of right hand dog bite. . Pt and OT discussed evaluation findings, pathology, POC and HEP.  Pt voiced understanding and performs HEP correctly without reported pain. Skilled Occupational Therapy is medically necessary to address the above impairments and reach functional goals.     OBJECTIVE    OBSERVATION: Unremarkable  significant swelling at the right MCP joints, rigid scar tissue over the MCP joint of the MF    ORTHOTICS: none    SCAR: Adhered Mod      SENSORY: WNL      CIRCUMFERENTIAL EDEMA (cm):  Right MCP: 23.1  Left MCP: 22.4    ROM: (* denotes performed with pain)  Shoulder  Elbow Wrist   Flexion: R wnl; L wnl  Abduction: R wnl; L wnl  ER: R wnl; L wnl  IR: R wnl; L wnl Flexion: R wnl; L wnl  Extension: R wnl; L wnl  Supination: R wnl, L wnl  Pronation: R wnl, L wnl Flexion: R wnl, L wnl  Extension: R wnl, L wnl  Ulnar Deviation: R wnl, L wnl  Radial Deviation R wnl, L wnl     AROM/PROM:(Degrees)  RIGHT HAND:    Thumb IF MF RF SF   MP  0/85 -25/75 0/75 0/75   PIP  0.90 0/85 0/90 0/100   DIP  0/60 0/45 0/45 0/60   PENNINGTON  235 180 210 235     LEFT HAND:   Pt. Able to make a full fist with his left hand     MANUAL MUSCLE TESTING: (* denotes performed with pain) deferred   Deferred   Strength (lbs) Right Average Left Average   :     2 pt Pinch:     3 pt Pinch:     Lateral Pinch:         Today’s Treatment and Response:   Pt education was provided on exam findings, treatment diagnosis, treatment plan, expectations, and prognosis.   Patient was instructed in and issued a HEP for: flexor tendon glides, AROM/PROM all digits of the right hand, intrinsic stretches, extrinsic forearm flexor and extensor stretches, place and hold digital extension, pink sponges for digital adduction and flexion, scar mobilization  all ex 10 reps each hour     Charges: OT Eval: Low Complexity, there ex x 1       Total Timed Treatment: 20 min     Total Treatment Time: 45 min     Based on clinical rationale and outcome measures, this evaluation involved Low Complexity decision making due to 1-2 personal factors/comorbidities, brief chart review, 3 body structures involved/activity limitations, and evolving symptoms including  pain and swelling  .  PLAN OF CARE   Goals: (to be met in 8 visits)    1.Pt will be independent and compliant with comprehensive HEP to maintain progress achieved in OT  2. Increase PENNINGTON of MF from 180 to 210 for ease of grasping every day items   3. Increase PENNINGTON of  digits 2, 4 and 5 to normal for ease of grasp   4. Decrease scar adherence to minimally rigid for ease of MF ROM and tendon glide   5. Decrease swelling at the MCP joints by 1 cm for ease of ROM  6. Pt. Will have at least 40 lbs of  strength ( to be measured as pain subsides)  7. Improve quick dash to 40 percent     Frequency / Duration: Patient will be seen for 1-2 x/week or a total of 8 visits over a 90 day period.  Treatment will include: Manual Therapy, Self-Care Home Management, Therapeutic Activities, Therapeutic Exercise, Home Exercise Program instruction, and Modalities to include: hot/cold pack     Education or treatment limitation: None  Rehab Potential:good    QuickDASH Outcome Score  Score: 63.64 % (7/19/2024  8:38 AM)      Patient/Family/Caregiver was advised of these findings, precautions, and treatment options and has agreed to actively participate in planning and for this course of care.    Thank you for your referral. Please co-sign or sign and return this letter via fax as soon as possible to [phone]. If you have any questions, please contact me at Dept: [phone]    Sincerely,  Electronically signed by therapist: Darlin Alba, OT  Physician's certification required: Yes  I certify the need for these services furnished under this plan of treatment and while under my care.    X___________________________________________________ Date____________________    Certification From: 7/19/2024  To:10/17/2024

## 2024-07-22 ENCOUNTER — OFFICE VISIT (OUTPATIENT)
Dept: OCCUPATIONAL MEDICINE | Facility: HOSPITAL | Age: 44
End: 2024-07-22
Attending: ORTHOPAEDIC SURGERY
Payer: COMMERCIAL

## 2024-07-22 PROCEDURE — 97110 THERAPEUTIC EXERCISES: CPT

## 2024-07-22 NOTE — PROGRESS NOTES
Diagnosis:   Dog bite of right hand, initial encounter (S61.451A,W54.0XXA)       Referring Provider: Wilfredo Mosqueda  Date of Evaluation:    7/19/2024    Precautions:  Begin AROM/PROM. Light resistance. No WB restrictions once sutures removed.  Next MD visit:   none scheduled  Date of Surgery: n/a   Insurance Primary/Secondary: [employer] [place] PPO / N/A     # Auth Visits: 2/8            Subjective: Patient notes he has been completing home exercises. Pain reaches 3/10 at times with movement and palpation along scar area.      Objective:      SCAR: Patient noted with moderately adhered scar tissue to dorsal R middle finger MCP area and webspace between D1/D2 scar area.     CIRCUMFERENTIAL EDEMA (cm):  MCPs: R=23.1cm; L=22.4cm     ROM: (* denotes performed with pain)  Shoulder  Elbow Wrist   Flexion: R wnl; L wnl  Abduction: R wnl; L wnl  ER: R wnl; L wnl  IR: R wnl; L wnl Flexion: R wnl; L wnl  Extension: R wnl; L wnl  Supination: R wnl, L wnl  Pronation: R wnl, L wnl Flexion: R wnl, L wnl  Extension: R wnl, L wnl  Ulnar Deviation: R wnl, L wnl  Radial Deviation R wnl, L wnl      AROM/PROM:(Degrees)  RIGHT HAND:     Thumb IF MF RF SF   MP   0/85 -25/75 0/75 0/75   PIP   0.90 0/85 0/90 0/100   DIP   0/60 0/45 0/45 0/60   PENNINGTON   235 180 210 235      Assessment: Patient progressing well but continues to demonstrate stiffness to digits of R hand and swelling/edema alongside need for scar management for RUE dorsal middle finger MCP area and webspace between D1/D2. Patient educated and continuing with scar management, ROM exercises, and light gripping exercises. Patient would benefit from continued OT services to maximize rehab potential.      Goals:  (to be met in 8 visits)   1. Pt will be independent and compliant with comprehensive HEP to maintain progress achieved in OT  2. Increase PENNINGTON of MF from 180 to 210 for ease of grasping every day items   3. Increase PENNINGTON of digits 2, 4 and 5 to normal for ease of grasp   4. Decrease scar  adherence to minimally rigid for ease of MF ROM and tendon glide   5. Decrease swelling at the MCP joints by 1 cm for ease of ROM  6. Pt. Will have at least 40 lbs of  strength ( to be measured as pain subsides)  7. Improve quick dash to 40 percent     Plan: Patient will continue to be seen for 1-2 x/week or a total of 8 visits over a 90 day period.  Treatment will include: Manual Therapy, Self-Care Home Management, Therapeutic Activities, Therapeutic Exercise, Home Exercise Program instruction, and Modalities to include: hot/cold pack     Date: 7/22/2024  TX#: 2/8 Date:                 TX#: 3/8 Date:                 TX#: 4/8 Date:                 TX#: 5/8 Date:   Tx#: 6/8   -3 min hot pack to R hand  -Ultrasound treatment to volar R MCP of middle finger area w/ settings 3.3 MHz, 1.2 W/cm2, 50% duty for 5 min  -Ultrasound treatment to lateral webspace scar area between D1/D2 w/ settings 3.3 MHz, 1.2 W/cm2, 50% duty for 5 min  -AAROM/PROM stretching of digit MCP/PIP/DIPs  -IASTM/STM to scar tissue areas  -Patient education on use of heat/cold/contrast bath  -Review of HEP             HEP: flexor tendon glides, AROM/PROM all digits of the right hand, intrinsic stretches, extrinsic forearm flexor and extensor stretches, place and hold digital extension, pink sponges for digital adduction and flexion, scar mobilization all ex 10 reps each hour     Charges: TEx2       Total Timed Treatment: 30 min  Total Treatment Time: 30 min

## 2024-07-23 ENCOUNTER — TELEPHONE (OUTPATIENT)
Dept: PHYSICAL THERAPY | Facility: HOSPITAL | Age: 44
End: 2024-07-23

## 2024-07-24 ENCOUNTER — APPOINTMENT (OUTPATIENT)
Dept: OCCUPATIONAL MEDICINE | Facility: HOSPITAL | Age: 44
End: 2024-07-24
Attending: ORTHOPAEDIC SURGERY
Payer: COMMERCIAL

## 2024-07-29 ENCOUNTER — TELEPHONE (OUTPATIENT)
Dept: OCCUPATIONAL MEDICINE | Facility: HOSPITAL | Age: 44
End: 2024-07-29

## 2024-07-29 ENCOUNTER — APPOINTMENT (OUTPATIENT)
Dept: OCCUPATIONAL MEDICINE | Facility: HOSPITAL | Age: 44
End: 2024-07-29
Attending: ORTHOPAEDIC SURGERY
Payer: COMMERCIAL

## 2024-07-31 ENCOUNTER — APPOINTMENT (OUTPATIENT)
Dept: OCCUPATIONAL MEDICINE | Facility: HOSPITAL | Age: 44
End: 2024-07-31
Attending: ORTHOPAEDIC SURGERY
Payer: COMMERCIAL

## 2024-08-01 ENCOUNTER — OFFICE VISIT (OUTPATIENT)
Dept: ORTHOPEDICS CLINIC | Facility: CLINIC | Age: 44
End: 2024-08-01
Payer: COMMERCIAL

## 2024-08-01 VITALS — WEIGHT: 180 LBS | HEIGHT: 74 IN | BODY MASS INDEX: 23.1 KG/M2
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
## 2024-08-01 DIAGNOSIS — S61.451A DOG BITE OF RIGHT HAND, INITIAL ENCOUNTER: ICD-10-CM

## 2024-08-01 DIAGNOSIS — S61.451A DOG BITE, HAND, RIGHT, INITIAL ENCOUNTER: Primary | ICD-10-CM
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
## 2024-08-01 PROCEDURE — 99024 POSTOP FOLLOW-UP VISIT: CPT | Performed by: ORTHOPAEDIC SURGERY

## 2024-08-01 PROCEDURE — 3008F BODY MASS INDEX DOCD: CPT | Performed by: ORTHOPAEDIC SURGERY

## 2024-08-01 NOTE — PROGRESS NOTES
Clinic Note     Assessment/Plan:  43 year old male    Status post irrigation debridement of right dorsal hand and middle finger MCP arthrotomy on 7/3/2024-patient is progressing appropriately from a range of motion standpoint.  He does have a little bit of an extensor lag at the MCP joint.  Continue tendon gliding exercises focused on EDC excursion.  Reassured the patient that this will likely improve with time.  If it does not we can consider tenolysis in 3 to 4 months    Follow Up: 4 to 6 weeks    Diagnostic Studies:     None       Physical Exam:     Ht 6' 2\" (1.88 m)   Wt 180 lb (81.6 kg)   BMI 23.11 kg/m²     Constitutional: NAD. AOx3. Well-developed and Well-nourished.   Psychiatric: Normal mood/ affect/ behavior. Judgment and thought content normal.     Right Upper Extremity:     Inspection    Skin incision well-healed.  Swelling and erythema continues to improve   Palpation    Mild tenderness to palpation around the MCP and dorsal hand      ROM    10-15 degrees of extensor lag, full MCP flexion.  Full composite fist.     Neurovascular    Normal sensation in the median, ulnar, and radial nerve distribution. Normal motor function of muscles innervated by median/AIN, ulnar, and radial/PIN nerves.    Normally perfused hand(s).          CC: Irrigation debridement status post dog bite    HPI: This 43 year old RHD male presents status post irrigation debridement secondary to a dog bite.  Pain and swelling has improved.  He still has significant difficulty with range of motion secondary to pain and stiffness.    Interval Hx (8/1/2024): Swelling and pain is improved.  He does have some residual pain.  He has been able to return to work but is minimally using his right hand.  Still has difficulty with full MCP extension.  He is largely recovered all of his MCP flexion.    Occupation:  for [employer]    History/Other:   No past medical history on file.  Past Surgical History:   Procedure  Laterality Date    Other surgical history       Current Outpatient Medications   Medication Sig Dispense Refill    ALPRAZolam 0.25 MG Oral Tab Take 1 tablet (0.25 mg total) by mouth 2 (two) times daily as needed. 20 tablet 0    HYDROcodone-acetaminophen 5-325 MG Oral Tab Take 1 tablet by mouth every 6 (six) hours as needed. 20 tablet 0    methylPREDNISolone (MEDROL) 4 MG Oral Tablet Therapy Pack As directed. 1 each 0    methylPREDNISolone (MEDROL) 4 MG Oral Tablet Therapy Pack As directed. 1 each 0    cefTRIAXone in dextrose 5% (ROCEPHIN) IVPB premix Inject 50 mL (2 g total) into the vein daily for 24 days. Will need weekly labs with CBC, BMP, ESR and CRP while on this medication. 1200 mL 0     Allergies   Allergen Reactions    Vancomycin FACE FLUSHING     Redness and itching on scalp,face ,neck and upper chest area     Family History   Problem Relation Age of Onset    Heart Disease Maternal Grandfather     Cancer Neg     Stroke Neg      Social History     Occupational History    Not on file   Tobacco Use    Smoking status: Former     Current packs/day: 0.75     Types: Cigarettes     Passive exposure: Never    Smokeless tobacco: Never   Substance and Sexual Activity    Alcohol use: Yes     Comment: 1-2 times a week    Drug use: Not Currently    Sexual activity: Not on file          Review of Systems (negative unless bolded):  General: fevers, chills, fatigue  CV:  chest pain, palpitations, leg swelling  Msk: bodyaches, neck pain, neck stiffness  Skin: rashes, open wounds, nonhealing ulcers  Hem: bleeds easily, bruise easily, immunocompromised  Neuro: dizziness, light headedness, headaches  Psych: anxious, depressed, anger issues      Wilfredo Mosqueda MD   Hand, Wrist, & Elbow Surgery  [email or web address]  t: [phone]  f: [phone]

## 2024-08-09 ENCOUNTER — OFFICE VISIT (OUTPATIENT)
Dept: OCCUPATIONAL MEDICINE | Facility: HOSPITAL | Age: 44
End: 2024-08-09
Attending: ORTHOPAEDIC SURGERY
Payer: COMMERCIAL

## 2024-08-09 PROCEDURE — 97110 THERAPEUTIC EXERCISES: CPT

## 2024-08-09 PROCEDURE — 97035 APP MDLTY 1+ULTRASOUND EA 15: CPT

## 2024-08-09 NOTE — PROGRESS NOTES
Diagnosis:   Dog bite of right hand, initial encounter (S61.451A,W54.0XXA)       Referring Provider: Wilfredo Mosqueda  Date of Evaluation:    7/19/2024    Precautions:  Begin AROM/PROM. Light resistance. No WB restrictions once sutures removed.  Next MD visit:   none scheduled  Date of Surgery: n/a   Insurance Primary/Secondary: [employer] [place] PPO / N/A     # Auth Visits: 3/8            Subjective: Patient notes he has returned back to work. Notes pain 1-2/10 at this time and that ROM slowly improving.      Objective:      SCAR: Patient noted with moderately adhered scar tissue to dorsal R middle finger MCP area and webspace between D1/D2 scar area.     CIRCUMFERENTIAL EDEMA (cm):  MCPs: R=23.1cm; L=22.4cm     ROM: (* denotes performed with pain)  Shoulder  Elbow Wrist   Flexion: R wnl; L wnl  Abduction: R wnl; L wnl  ER: R wnl; L wnl  IR: R wnl; L wnl Flexion: R wnl; L wnl  Extension: R wnl; L wnl  Supination: R wnl, L wnl  Pronation: R wnl, L wnl Flexion: R wnl, L wnl  Extension: R wnl, L wnl  Ulnar Deviation: R wnl, L wnl  Radial Deviation R wnl, L wnl      AROM/PROM:(Degrees)  RIGHT HAND:     Thumb IF MF RF SF   MP   0/85 -10/75 0/75 0/75   PIP   0.90 0/85 0/90 0/100   DIP   0/60 0/45 0/45 0/60   [place]   235 180 210 235       Strength : R=56.6 lbs; L=107.8 lbs      Assessment: Reviewed digit intrinsic stretches and patient independent at this time. Patient to continue with scar massage and progress with functional return.      Goals:  (to be met in 8 visits)   1. Pt will be independent and compliant with comprehensive HEP to maintain progress achieved in OT  2. Increase [place] of MF from 180 to 210 for ease of grasping every day items   3. Increase [place] of digits 2, 4 and 5 to normal for ease of grasp   4. Decrease scar adherence to minimally rigid for ease of MF ROM and tendon glide   5. Decrease swelling at the MCP joints by 1 cm for ease of ROM  6. Pt. Will have at least 40 lbs of  strength ( to be measured as pain  subsides)  7. Improve quick dash to 40 percent     Plan: Patient will continue to be seen for 1-2 x/week or a total of 8 visits over a 90 day period.  Treatment will include: Manual Therapy, Self-Care Home Management, Therapeutic Activities, Therapeutic Exercise, Home Exercise Program instruction, and Modalities to include: hot/cold pack     Date: 7/22/2024  TX#: 2/8 Date: 8/9/2024                TX#: 3/8 Date:                 TX#: 4/8 Date:                 TX#: 5/8 Date:   Tx#: 6/8   -3 min hot pack to R hand  -Ultrasound treatment to volar R MCP of middle finger area w/ settings 3.3 MHz, 1.2 W/cm2, 50% duty for 5 min  -Ultrasound treatment to lateral webspace scar area between D1/D2 w/ settings 3.3 MHz, 1.2 W/cm2, 50% duty for 5 min  -AAROM/PROM stretching of digit MCP/PIP/DIPs  -IASTM/STM to scar tissue areas  -Patient education on use of heat/cold/contrast bath  -Review of HEP     -5  min hot pack to R hand  -Ultrasound treatment to volar R MCP of middle finger area w/ settings 3.3 MHz, 1.2 W/cm2, 50% duty for 5 min  -AAROM/PROM stretching of digit MCP/PIP/DIPs  -IASTM/STM to scar tissue areas  -Intrinsic digit stretches        HEP: flexor tendon glides, AROM/PROM all digits of the right hand, intrinsic stretches, extrinsic forearm flexor and extensor stretches, place and hold digital extension, pink sponges for digital adduction and flexion, scar mobilization all ex 10 reps each hour     Charges: TEx2, USx1,       Total Timed Treatment: 45 min  Total Treatment Time: 45 min

## 2024-08-15 NOTE — PROGRESS NOTES
Diagnosis:   Dog bite of right hand, initial encounter (S61.451A,W54.0XXA)       Referring Provider: Wilfredo Mosqueda  Date of Evaluation:    7/19/2024    Precautions:  Begin AROM/PROM. Light resistance. No WB restrictions once sutures removed.  Next MD visit:   none scheduled  Date of Surgery: n/a   Insurance Primary/Secondary: [employer] [place] PPO / N/A     # Auth Visits: 3/8            Subjective: Patient reports that his hand aches a little                       Patient also reports that he is able to do a pull up and make a tight fist                        Patient notes that he just isn't able to extend his finger all the way    Objective:      SCAR: moderately adhered scar tissue to dorsal R middle finger MCP area and webspace between D1/D2 scar area.     CIRCUMFERENTIAL EDEMA (cm):  MCPs: R=23.1cm; L=22.4cm     ROM: (* denotes performed with pain)  Shoulder  Elbow Wrist   Flexion: R wnl; L wnl  Abduction: R wnl; L wnl  ER: R wnl; L wnl  IR: R wnl; L wnl Flexion: R wnl; L wnl  Extension: R wnl; L wnl  Supination: R wnl, L wnl  Pronation: R wnl, L wnl Flexion: R wnl, L wnl  Extension: R wnl, L wnl  Ulnar Deviation: R wnl, L wnl  Radial Deviation R wnl, L wnl      AROM/PROM:(Degrees)  RIGHT HAND:     Thumb IF MF RF SF   MP   0/85 -10/75 0/75 0/75   PIP   0.90 0/85 0/90 0/100   DIP   0/60 0/45 0/45 0/60   [place]   235 180 210 235       Strength : R=56.6 lbs; L=107.8 lbs      Assessment:   Patient presents with the ability to make a full tight fist, however, significant scar tissue is impacting MP extension of his middle finger       Goals:  (to be met in 8 visits)   1. Pt will be independent and compliant with comprehensive HEP to maintain progress achieved in OT  2. Increase [place] of MF from 180 to 210 for ease of grasping every day items   3. Increase [place] of digits 2, 4 and 5 to normal for ease of grasp   4. Decrease scar adherence to minimally rigid for ease of MF ROM and tendon glide   5. Decrease swelling at the MCP  joints by 1 cm for ease of ROM  6. Pt. Will have at least 40 lbs of  strength ( to be measured as pain subsides)  7. Improve quick dash to 40 percent     Plan: Patient will continue to be seen for 1-2 x/week or a total of 8 visits over a 90 day period.  Treatment will include: Manual Therapy, Self-Care Home Management, Therapeutic Activities, Therapeutic Exercise, Home Exercise Program instruction, and Modalities to include: hot/cold pack     Date: 7/22/2024  TX#: 2/8 Date: 8/9/2024                TX#: 3/8 Date:     8/16/2024                TX#: 4/8 Date:                 TX#: 5/8 Date:   Tx#: 6/8   -3 min hot pack to R hand  -Ultrasound treatment to volar R MCP of middle finger area w/ settings 3.3 MHz, 1.2 W/cm2, 50% duty for 5 min  -Ultrasound treatment to lateral webspace scar area between D1/D2 w/ settings 3.3 MHz, 1.2 W/cm2, 50% duty for 5 min  -AAROM/PROM stretching of digit MCP/PIP/DIPs  -IASTM/STM to scar tissue areas  -Patient education on use of heat/cold/contrast bath  -Review of HEP     -5  min hot pack to R hand  -Ultrasound treatment to volar R MCP of middle finger area w/ settings 3.3 MHz, 1.2 W/cm2, 50% duty for 5 min  -AAROM/PROM stretching of digit MCP/PIP/DIPs  -IASTM/STM to scar tissue areas  -Intrinsic digit stretches Manual therapy 30 min    Hot pack 5 min n/c    Cupping to scar over the MCP joint of the middle finger    IASTM utilizing the Graston technique with instrument number 3 over the scar     Pinch and rolling technique to scar tissue   EDC glides   Information provided for the [employer] snake bit kit for pt. To use over the scar   Issued dycem to massage the scar  Applied kinesiotape for scar and to facilitate MF MCP extension     Cut a few pieces of kinesiotape for home          HEP: flexor tendon glides, AROM/PROM all digits of the right hand, intrinsic stretches, extrinsic forearm flexor and extensor stretches, place and hold digital extension, pink sponges for digital adduction  and flexion, scar mobilization all ex 10 reps each hour   8/16/2024  information provided on the extractor for scar mobilization, instructions for kinesiotape for scar     Charges:  manual therapy x 2       Total Timed Treatment: 30 min  Total Treatment Time: 30 min

## 2024-08-16 ENCOUNTER — OFFICE VISIT (OUTPATIENT)
Dept: OCCUPATIONAL MEDICINE | Facility: HOSPITAL | Age: 44
End: 2024-08-16
Attending: ORTHOPAEDIC SURGERY
Payer: COMMERCIAL

## 2024-08-16 PROCEDURE — 97140 MANUAL THERAPY 1/> REGIONS: CPT

## 2024-08-20 ENCOUNTER — OFFICE VISIT (OUTPATIENT)
Dept: OCCUPATIONAL MEDICINE | Facility: HOSPITAL | Age: 44
End: 2024-08-20
Attending: ORTHOPAEDIC SURGERY
Payer: COMMERCIAL

## 2024-08-20 PROCEDURE — 97110 THERAPEUTIC EXERCISES: CPT

## 2024-08-20 PROCEDURE — 97140 MANUAL THERAPY 1/> REGIONS: CPT

## 2024-08-20 NOTE — PROGRESS NOTES
Diagnosis:   Dog bite of right hand, initial encounter (S61.451A,W54.0XXA)       Referring Provider: Wilfredo Mosqueda  Date of Evaluation:    7/19/2024    Precautions:  Begin AROM/PROM. Light resistance. No WB restrictions once sutures removed.  Next MD visit:   none scheduled  Date of Surgery: n/a   Insurance Primary/Secondary: [employer] [place] PPO / N/A     # Auth Visits: 5/8            Subjective: Patient reports that it is so frustrating not being able to extend his finger     Objective:      SCAR: moderately adhered scar tissue to dorsal R middle finger MCP area and webspace between D1/D2 scar area.     CIRCUMFERENTIAL EDEMA (cm):  MCPs: R=23.1cm; L=22.4cm     ROM: (* denotes performed with pain)  Shoulder  Elbow Wrist   Flexion: R wnl; L wnl  Abduction: R wnl; L wnl  ER: R wnl; L wnl  IR: R wnl; L wnl Flexion: R wnl; L wnl  Extension: R wnl; L wnl  Supination: R wnl, L wnl  Pronation: R wnl, L wnl Flexion: R wnl, L wnl  Extension: R wnl, L wnl  Ulnar Deviation: R wnl, L wnl  Radial Deviation R wnl, L wnl      AROM/PROM:(Degrees)  RIGHT HAND:     Thumb IF MF RF SF   MP   0/85 -10/75 0/75 0/75   PIP   0.90 0/85 0/90 0/100   DIP   0/60 0/45 0/45 0/60   [place]   235 180 210 235       Strength : R=56.6 lbs; L=107.8 lbs      Assessment:   Patient appears to be responding well to the cupping technique. His scar is slightly softer and more pliable today.      Goals:  (to be met in 8 visits)   1. Pt will be independent and compliant with comprehensive HEP to maintain progress achieved in OT  2. Increase [place] of MF from 180 to 210 for ease of grasping every day items   3. Increase [place] of digits 2, 4 and 5 to normal for ease of grasp   4. Decrease scar adherence to minimally rigid for ease of MF ROM and tendon glide   5. Decrease swelling at the MCP joints by 1 cm for ease of ROM  6. Pt. Will have at least 40 lbs of  strength ( to be measured as pain subsides)  7. Improve quick dash to 40 percent     Plan: Patient will continue  to be seen for 1-2 x/week or a total of 8 visits over a 90 day period.  Treatment will include: Manual Therapy, Self-Care Home Management, Therapeutic Activities, Therapeutic Exercise, Home Exercise Program instruction, and Modalities to include: hot/cold pack     Date: 7/22/2024  TX#: 2/8 Date: 8/9/2024                TX#: 3/8 Date:     8/16/2024                TX#: 4/8 Date:        8/20/2024           TX#: 5/8 Date:   Tx#: 6/8   -3 min hot pack to R hand  -Ultrasound treatment to volar R MCP of middle finger area w/ settings 3.3 MHz, 1.2 W/cm2, 50% duty for 5 min  -Ultrasound treatment to lateral webspace scar area between D1/D2 w/ settings 3.3 MHz, 1.2 W/cm2, 50% duty for 5 min  -AAROM/PROM stretching of digit MCP/PIP/DIPs  -IASTM/STM to scar tissue areas  -Patient education on use of heat/cold/contrast bath  -Review of HEP     -5  min hot pack to R hand  -Ultrasound treatment to volar R MCP of middle finger area w/ settings 3.3 MHz, 1.2 W/cm2, 50% duty for 5 min  -AAROM/PROM stretching of digit MCP/PIP/DIPs  -IASTM/STM to scar tissue areas  -Intrinsic digit stretches Manual therapy 30 min    Hot pack 5 min n/c    Cupping to scar over the MCP joint of the middle finger    IASTM utilizing the Graston technique with instrument number 3 over the scar     Pinch and rolling technique to scar tissue   EDC glides   Information provided for the [employer] snake bit kit for pt. To use over the scar   Issued dycem to massage the scar  Applied kinesiotape for scar and to facilitate MF MCP extension     Cut a few pieces of kinesiotape for home    Hot pack 5 min n/c  Manual Therapy 10 min  Cupping to scar over the MCP joint of the middle finger    IASTM utilizing the Graston technique with instrument number 3 over the scar     Pinch and rolling technique to scar tissue     There ex 30 min  EDC glides     Fabricated keyshawn strap  IF to MF for extension assist during the day    with yellow putty and digital extension with the  yellow putty  Yellow putty issued for home       HEP: flexor tendon glides, AROM/PROM all digits of the right hand, intrinsic stretches, extrinsic forearm flexor and extensor stretches, place and hold digital extension, pink sponges for digital adduction and flexion, scar mobilization all ex 10 reps each hour   8/16/2024  information provided on the extractor for scar mobilization, instructions for kinesiotape for scar   08/20/2024  yellow putty for  and digital extension 2-3 x per day     Charges:  manual therapy x 1, there ex x 2     Total Timed Treatment: 40 min  Total Treatment Time: 45min

## 2024-08-23 ENCOUNTER — OFFICE VISIT (OUTPATIENT)
Dept: OCCUPATIONAL MEDICINE | Facility: HOSPITAL | Age: 44
End: 2024-08-23
Attending: ORTHOPAEDIC SURGERY
Payer: COMMERCIAL

## 2024-08-23 PROCEDURE — 97140 MANUAL THERAPY 1/> REGIONS: CPT

## 2024-08-23 PROCEDURE — 97110 THERAPEUTIC EXERCISES: CPT

## 2024-08-23 NOTE — PROGRESS NOTES
Diagnosis:   Dog bite of right hand, initial encounter (S61.451A,W54.0XXA)       Referring Provider: Wilfredo Mosqueda  Date of Evaluation:    7/19/2024    Precautions:  Begin AROM/PROM. Light resistance. No WB restrictions once sutures removed.  Next MD visit:   none scheduled  Date of Surgery: n/a   Insurance Primary/Secondary: [employer] [place] PPO / N/A     # Auth Visits: 6/8            Subjective: Patient reports that he has been working on scar and stretches. Still difficulty with extending middle finger.    Objective:      SCAR: moderately adhered scar tissue to dorsal R middle finger MCP area and webspace between D1/D2 scar area.     CIRCUMFERENTIAL EDEMA (cm):  MCPs: R=23.1cm; L=22.4cm     ROM: (* denotes performed with pain)  Shoulder  Elbow Wrist   Flexion: R wnl; L wnl  Abduction: R wnl; L wnl  ER: R wnl; L wnl  IR: R wnl; L wnl Flexion: R wnl; L wnl  Extension: R wnl; L wnl  Supination: R wnl, L wnl  Pronation: R wnl, L wnl Flexion: R wnl, L wnl  Extension: R wnl, L wnl  Ulnar Deviation: R wnl, L wnl  Radial Deviation R wnl, L wnl      AROM/PROM:(Degrees)  RIGHT HAND:     Thumb IF MF RF SF   MP   0/85 -10/75 0/75 0/75   PIP   0.90 0/85 0/90 0/100   DIP   0/60 0/45 0/45 0/60   [place]   235 180 210 235       Strength : R=56.6 lbs; L=107.8 lbs      Assessment:   Patient appears to be responding well to the cupping technique. His scar is slightly softer and more pliable today. Still difficulty with full R middle finger extension but slightly improving.      Goals:  (to be met in 8 visits)   1. Pt will be independent and compliant with comprehensive HEP to maintain progress achieved in OT  2. Increase [place] of MF from 180 to 210 for ease of grasping every day items   3. Increase [place] of digits 2, 4 and 5 to normal for ease of grasp   4. Decrease scar adherence to minimally rigid for ease of MF ROM and tendon glide   5. Decrease swelling at the MCP joints by 1 cm for ease of ROM  6. Pt. Will have at least 40 lbs of   strength ( to be measured as pain subsides)  7. Improve quick dash to 40 percent     Plan: Patient will continue to be seen for 1-2 x/week or a total of 8 visits over a 90 day period.  Treatment will include: Manual Therapy, Self-Care Home Management, Therapeutic Activities, Therapeutic Exercise, Home Exercise Program instruction, and Modalities to include: hot/cold pack     Date: 7/22/2024  TX#: 2/8 Date: 8/9/2024                TX#: 3/8 Date: 8/16/2024  TX#: 4/8 Date: 8/20/2024     TX#: 5/8 Date: 8/23/2024  Tx#: 6/8   -3 min hot pack to R hand  -Ultrasound treatment to volar R MCP of middle finger area w/ settings 3.3 MHz, 1.2 W/cm2, 50% duty for 5 min  -Ultrasound treatment to lateral webspace scar area between D1/D2 w/ settings 3.3 MHz, 1.2 W/cm2, 50% duty for 5 min  -AAROM/PROM stretching of digit MCP/PIP/DIPs  -IASTM/STM to scar tissue areas  -Patient education on use of heat/cold/contrast bath  -Review of HEP     -5  min hot pack to R hand  -Ultrasound treatment to volar R MCP of middle finger area w/ settings 3.3 MHz, 1.2 W/cm2, 50% duty for 5 min  -AAROM/PROM stretching of digit MCP/PIP/DIPs  -IASTM/STM to scar tissue areas  -Intrinsic digit stretches Manual therapy 30 min    Hot pack 5 min n/c    Cupping to scar over the MCP joint of the middle finger    IASTM utilizing the Graston technique with instrument number 3 over the scar     Pinch and rolling technique to scar tissue   EDC glides   Information provided for the [employer] snake bit kit for pt. To use over the scar   Issued dycem to massage the scar  Applied kinesiotape for scar and to facilitate MF MCP extension     Cut a few pieces of kinesiotape for home    Hot pack 5 min n/c  Manual Therapy 10 min  Cupping to scar over the MCP joint of the middle finger    IASTM utilizing the Graston technique with instrument number 3 over the scar     Pinch and rolling technique to scar tissue     There ex 30 min  EDC glides     Fabricated keyshawn strap  IF to MF for  extension assist during the day    with yellow putty and digital extension with the yellow putty  Yellow putty issued for home  -Hot pack 5 min n/c  -Manual Therapy 10 min  -Cupping to scar over the MCP joint of the middle finger  -IASTM utilizing the Graston technique with instrument number 3 over the scar   -Ultrasound treatment to lateral webspace scar area between D1/D2 w/ settings 3.3 MHz, 1.2 W/cm2, 50% duty for 5 min  -AAROM/PROM stretching of digit MCP/PIP/DIPs     HEP: flexor tendon glides, AROM/PROM all digits of the right hand, intrinsic stretches, extrinsic forearm flexor and extensor stretches, place and hold digital extension, pink sponges for digital adduction and flexion, scar mobilization all ex 10 reps each hour   8/16/2024  information provided on the extractor for scar mobilization, instructions for kinesiotape for scar   08/20/2024  yellow putty for  and digital extension 2-3 x per day     Charges: MTx2, TEx1     Total Timed Treatment: 40 min  Total Treatment Time: 45min

## 2024-08-29 ENCOUNTER — OFFICE VISIT (OUTPATIENT)
Dept: OCCUPATIONAL MEDICINE | Facility: HOSPITAL | Age: 44
End: 2024-08-29
Attending: ORTHOPAEDIC SURGERY
Payer: COMMERCIAL

## 2024-08-29 PROCEDURE — 97035 APP MDLTY 1+ULTRASOUND EA 15: CPT

## 2024-08-29 PROCEDURE — 97110 THERAPEUTIC EXERCISES: CPT

## 2024-08-29 NOTE — PROGRESS NOTES
Diagnosis:   Dog bite of right hand, initial encounter (S61.451A,W54.0XXA)       Referring Provider: Wilfredo Mosqueda  Date of Evaluation:    7/19/2024    Precautions:  Begin AROM/PROM. Light resistance. No WB restrictions once sutures removed.  Next MD visit:   none scheduled  Date of Surgery: n/a   Insurance Primary/Secondary: [employer] [place] PPO / N/A     # Auth Visits: 7/8            Subjective: Patient notes he has been working on scar massage and stretches. Some discomfort 3-4/10 aching between R webspace of D3/D4 scar tissue area. Progressing steadily overall.    Objective:      SCAR: moderately adhered scar tissue to dorsal R middle finger MCP area and webspace between D3/D4 scar area.     CIRCUMFERENTIAL EDEMA (cm):  MCPs: R=23.1cm; L=22.4cm     ROM: (* denotes performed with pain)  Shoulder  Elbow Wrist   Flexion: R wnl; L wnl  Abduction: R wnl; L wnl  ER: R wnl; L wnl  IR: R wnl; L wnl Flexion: R wnl; L wnl  Extension: R wnl; L wnl  Supination: R wnl, L wnl  Pronation: R wnl, L wnl Flexion: R wnl, L wnl  Extension: R wnl, L wnl  Ulnar Deviation: R wnl, L wnl  Radial Deviation R wnl, L wnl      AROM/PROM:(Degrees)  RIGHT HAND:     Thumb IF MF RF SF   MP   0/85 -10/75 0/75 0/75   PIP   0.90 0/85 0/90 0/100   DIP   0/60 0/45 0/45 0/60   [place]   235 180 210 235       Strength : R=56.6 lbs; L=107.8 lbs      Assessment:   Patient appears to be responding well to the cupping technique. His scar is slightly softer and more pliable today. Still difficulty with full R middle finger extension but slightly improving.      Goals:  (to be met in 8 visits)   1. Pt will be independent and compliant with comprehensive HEP to maintain progress achieved in OT  2. Increase [place] of MF from 180 to 210 for ease of grasping every day items   3. Increase [place] of digits 2, 4 and 5 to normal for ease of grasp   4. Decrease scar adherence to minimally rigid for ease of MF ROM and tendon glide   5. Decrease swelling at the MCP joints by 1 cm for  ease of ROM  6. Pt. Will have at least 40 lbs of  strength ( to be measured as pain subsides)  7. Improve quick dash to 40 percent     Plan: Patient will continue to be seen for 1-2 x/week or a total of 8 visits over a 90 day period.  Treatment will include: Manual Therapy, Self-Care Home Management, Therapeutic Activities, Therapeutic Exercise, Home Exercise Program instruction, and Modalities to include: hot/cold pack     Date: 8/9/2024                TX#: 3/8 Date: 8/16/2024  TX#: 4/8 Date: 8/20/2024     TX#: 5/8 Date: 8/23/2024  Tx#: 6/8 Date: 8/29/2024  TX#: 7/8   -5  min hot pack to R hand  -Ultrasound treatment to volar R MCP of middle finger area w/ settings 3.3 MHz, 1.2 W/cm2, 50% duty for 5 min  -AAROM/PROM stretching of digit MCP/PIP/DIPs  -IASTM/STM to scar tissue areas  -Intrinsic digit stretches Manual therapy 30 min    Hot pack 5 min n/c    Cupping to scar over the MCP joint of the middle finger    IASTM utilizing the Graston technique with instrument number 3 over the scar     Pinch and rolling technique to scar tissue   EDC glides   Information provided for the [employer] snake bit kit for pt. To use over the scar   Issued dycem to massage the scar  Applied kinesiotape for scar and to facilitate MF MCP extension     Cut a few pieces of kinesiotape for home    Hot pack 5 min n/c  Manual Therapy 10 min  Cupping to scar over the MCP joint of the middle finger    IASTM utilizing the Graston technique with instrument number 3 over the scar     Pinch and rolling technique to scar tissue     There ex 30 min  EDC glides     Fabricated keyshawn strap  IF to MF for extension assist during the day    with yellow putty and digital extension with the yellow putty  Yellow putty issued for home  -Hot pack 5 min n/c  -Manual Therapy 10 min  -Cupping to scar over the MCP joint of the middle finger  -IASTM utilizing the Graston technique with instrument number 3 over the scar   -Ultrasound treatment to lateral  webspace scar area between D1/D2 w/ settings 3.3 MHz, 1.2 W/cm2, 50% duty for 5 min  -AAROM/PROM stretching of digit MCP/PIP/DIPs -Hot pack 5 min n/c  -Manual Therapy 10 min  -IASTM utilizing the Graston technique with instrument number 3 over the scar and webspace  -Ultrasound treatment to lateral webspace scar area between D1/D2 w/ settings 3.3 MHz, 1.2 W/cm2, 50% duty for 8 min  -AAROM/PROM stretching of digit MCP/PIP/DIPs  -Joint Mobilization and tendon gliding stretches     HEP: flexor tendon glides, AROM/PROM all digits of the right hand, intrinsic stretches, extrinsic forearm flexor and extensor stretches, place and hold digital extension, pink sponges for digital adduction and flexion, scar mobilization all ex 10 reps each hour   8/16/2024  information provided on the extractor for scar mobilization, instructions for kinesiotape for scar   08/20/2024  yellow putty for  and digital extension 2-3 x per day     Charges: USx1, TEx2    Total Timed Treatment: 40 min  Total Treatment Time: 45min

## 2024-09-03 ENCOUNTER — APPOINTMENT (OUTPATIENT)
Dept: OCCUPATIONAL MEDICINE | Facility: HOSPITAL | Age: 44
End: 2024-09-03
Attending: ORTHOPAEDIC SURGERY
Payer: COMMERCIAL

## 2024-09-06 ENCOUNTER — OFFICE VISIT (OUTPATIENT)
Dept: OCCUPATIONAL MEDICINE | Facility: HOSPITAL | Age: 44
End: 2024-09-06
Attending: ORTHOPAEDIC SURGERY
Payer: COMMERCIAL

## 2024-09-06 PROCEDURE — 97110 THERAPEUTIC EXERCISES: CPT

## 2024-09-06 PROCEDURE — 97140 MANUAL THERAPY 1/> REGIONS: CPT

## 2024-09-06 NOTE — PROGRESS NOTES
Diagnosis:   Dog bite of right hand, initial encounter (S61.451A,W54.0XXA)       Referring Provider: Wilfredo Mosqueda  Date of Evaluation:    7/19/2024    Precautions:  Begin AROM/PROM. Light resistance. No WB restrictions once sutures removed.  Next MD visit:   none scheduled  Date of Surgery: n/a   Insurance Primary/Secondary: [employer] [place] / N/A     # Auth Visits: 8/8            Certification From: 7/19/2024  To:10/17/2024       Progress Summary  Pt has attended 8 visits in Occupational Therapy.  Therapy has focused on ROM, scar mobilization and tendon gliding     Subjective: Patient notes that sometimes it is hard to reach and grab items because of the inability to extend his middle finger all the way     Objective:      SCAR: moderately adhered scar tissue to dorsal R middle finger MCP area and webspace between D3/D4 scar area.     CIRCUMFERENTIAL EDEMA (cm):  MCPs: R=23.1cm; L=22.4cm       AROM/PROM:(Degrees)  RIGHT HAND:     Thumb IF MF RF SF   MP   0/85 -10/75 0/75 0/75   PIP   0.90 0/85 0/90 0/100   DIP   0/60 0/45 0/45 0/60   PENNINGTON   235 180 210 235      RIGHT HAND: 9/6/2024      Thumb IF MF RF SF   MP    -10/85     PIP    0/100     DIP    0/60     PENNINGTON    235           Strength : R=56.6 lbs; L=107.8 lbs    9/6/2024   strength  Right 56 lbs       Assessment:       Patient presents with a significant improvement in PENNINGTON of his right middle finger. He continues to have significant scar rigidity impacting tendon excursion and MCP extension. His scar has become more pliable and softer since his initial evaluation. His right  strength is significantly weaker compared to his non dominant hand. He would benefit from continued therapy to focus on scar mobilization, tendon gliding/pull through, ROM and strengthening. He will benefit from 8 additional visits.       Goals:  (to be met in 8 visits)   1. Pt will be independent and compliant with comprehensive HEP to maintain progress achieved in OT  met  2. Increase  PENNINGTON of MF from 180 to 210 for ease of grasping every day items met  3. Increase PENNINGTON of digits 2, 4 and 5 to normal for ease of grasp  met  4. Decrease scar adherence to minimally rigid for ease of MF ROM and tendon glide  continue   5. Decrease swelling at the MCP joints by 1 cm for ease of ROM continue   6. Pt. Will have at least 40 lbs of  strength ( to be measured as pain subsides) met  7. Improve quick dash to 40 percent   met    New goals: 09/06/2024 to be met in 8 visits   Pt. Will increase right  strength to 65 lbs for ease of gripping tools to perform his job  Increase MP extension of the middle finger to -5 for eased ability to reach into his pocket    Continue goal number 3     QuickDASH Outcome Score  Score: 63.64 % (7/19/2024  8:38 AM)    Post QuickDASH Outcome Score  Post Score: 9.09 % (9/6/2024  7:18 AM)    54.55 % improvement    Plan: Continue skilled Occupational Therapy 1-2 x/week or a total of 8 visits over a 90 day period. Treatment will include: ROM, strengthening, scar mobilization, modalities , therapeutic exercises and activities       Patient/Family/Caregiver was advised of these findings, precautions, and treatment options and has agreed to actively participate in planning and for this course of care.    Thank you for your referral. If you have any questions, please contact me at Dept: [phone].    Sincerely,  Electronically signed by therapist: Darlin Alba OT     Physician's certification required:  No  Please co-sign or sign and return this letter via fax as soon as possible to [phone].   I certify the need for these services furnished under this plan of treatment and while under my care.    X___________________________________________________ Date____________________          Date: 8/9/2024                TX#: 3/8 Date: 8/16/2024  TX#: 4/8 Date: 8/20/2024     TX#: 5/8 Date: 8/23/2024  Tx#: 6/8 Date: 8/29/2024  TX#: 7/8 9/6/2024  TX  8/8   -5  min hot pack to R  hand  -Ultrasound treatment to volar R MCP of middle finger area w/ settings 3.3 MHz, 1.2 W/cm2, 50% duty for 5 min  -AAROM/PROM stretching of digit MCP/PIP/DIPs  -IASTM/STM to scar tissue areas  -Intrinsic digit stretches Manual therapy 30 min    Hot pack 5 min n/c    Cupping to scar over the MCP joint of the middle finger    IASTM utilizing the Graston technique with instrument number 3 over the scar     Pinch and rolling technique to scar tissue   EDC glides   Information provided for the sifuentes snake bit kit for pt. To use over the scar   Issued dycem to massage the scar  Applied kinesiotape for scar and to facilitate MF MCP extension     Cut a few pieces of kinesiotape for home    Hot pack 5 min n/c  Manual Therapy 10 min  Cupping to scar over the MCP joint of the middle finger    IASTM utilizing the Graston technique with instrument number 3 over the scar     Pinch and rolling technique to scar tissue     There ex 30 min  EDC glides     Fabricated keyshawn strap  IF to MF for extension assist during the day    with yellow putty and digital extension with the yellow putty  Yellow putty issued for home  -Hot pack 5 min n/c  -Manual Therapy 10 min  -Cupping to scar over the MCP joint of the middle finger  -IASTM utilizing the Graston technique with instrument number 3 over the scar   -Ultrasound treatment to lateral webspace scar area between D1/D2 w/ settings 3.3 MHz, 1.2 W/cm2, 50% duty for 5 min  -AAROM/PROM stretching of digit MCP/PIP/DIPs -Hot pack 5 min n/c  -Manual Therapy 10 min  -IASTM utilizing the Graston technique with instrument number 3 over the scar and webspace  -Ultrasound treatment to lateral webspace scar area between D1/D2 w/ settings 3.3 MHz, 1.2 W/cm2, 50% duty for 8 min  -AAROM/PROM stretching of digit MCP/PIP/DIPs  -Joint Mobilization and tendon gliding stretches Hot pack 5 min n/c    Manual Therapy 10 min  Cupping to scar over the MCP joint of the middle finger    IASTM utilizing the  Graston technique with instrument number 3 over the scar     Pinch and rolling technique to scar tissue     There ex 30 min  EDC glides   -AAROM/PROM stretching of digit MCP/PIP/DIPs  Tendon gliding  Intrinsic stretches   Measurements/PN       HEP: flexor tendon glides, AROM/PROM all digits of the right hand, intrinsic stretches, extrinsic forearm flexor and extensor stretches, place and hold digital extension, pink sponges for digital adduction and flexion, scar mobilization all ex 10 reps each hour   8/16/2024  information provided on the extractor for scar mobilization, instructions for kinesiotape for scar   08/20/2024  yellow putty for  and digital extension 2-3 x per day     Charges:manual therapy x 1, there ex x 2    Total Timed Treatment: 40 min  Total Treatment Time: 45min

## 2024-09-10 ENCOUNTER — APPOINTMENT (OUTPATIENT)
Dept: OCCUPATIONAL MEDICINE | Facility: HOSPITAL | Age: 44
End: 2024-09-10
Attending: ORTHOPAEDIC SURGERY
Payer: COMMERCIAL

## 2024-09-13 ENCOUNTER — OFFICE VISIT (OUTPATIENT)
Dept: OCCUPATIONAL MEDICINE | Facility: HOSPITAL | Age: 44
End: 2024-09-13
Attending: ORTHOPAEDIC SURGERY
Payer: COMMERCIAL

## 2024-09-13 ENCOUNTER — OFFICE VISIT (OUTPATIENT)
Dept: ORTHOPEDICS CLINIC | Facility: CLINIC | Age: 44
End: 2024-09-13
Payer: COMMERCIAL

## 2024-09-13 VITALS — WEIGHT: 180 LBS | HEIGHT: 74 IN | BODY MASS INDEX: 23.1 KG/M2
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
## 2024-09-13 DIAGNOSIS — S61.451A DOG BITE, HAND, RIGHT, INITIAL ENCOUNTER: Primary | ICD-10-CM

## 2024-09-13 PROCEDURE — 99024 POSTOP FOLLOW-UP VISIT: CPT | Performed by: ORTHOPAEDIC SURGERY

## 2024-09-13 PROCEDURE — 97035 APP MDLTY 1+ULTRASOUND EA 15: CPT

## 2024-09-13 PROCEDURE — 97110 THERAPEUTIC EXERCISES: CPT

## 2024-09-13 PROCEDURE — 3008F BODY MASS INDEX DOCD: CPT | Performed by: ORTHOPAEDIC SURGERY

## 2024-09-13 NOTE — PROGRESS NOTES
Diagnosis:   Dog bite of right hand, initial encounter (S61.451A,W54.0XXA)       Referring Provider: Wilfredo Mosqueda  Date of Evaluation:    7/19/2024    Precautions:  Begin AROM/PROM. Light resistance. No WB restrictions once sutures removed.  Next MD visit:   none scheduled  Date of Surgery: n/a   Insurance Primary/Secondary: [employer] [place] / N/A     # Auth Visits: 9/16           Subjective: Patient notes that he has been continuing with home exercises. Digit extension still difficult with R middle finger although he is able to extend digit. Seeing physician later today for post-op follow-up.    Objective:      SCAR: moderately adhered scar tissue to dorsal R middle finger MCP area and webspace between D3/D4 scar area.     CIRCUMFERENTIAL EDEMA (cm):  MCPs: R=23.1cm; L=22.4cm       AROM/PROM:(Degrees)  RIGHT HAND:     Thumb IF MF RF SF   MP   0/85 -10/75 0/75 0/75   PIP   0.90 0/85 0/90 0/100   DIP   0/60 0/45 0/45 0/60   PENNINGTON   235 180 210 235      RIGHT HAND: 9/6/2024      Thumb IF MF RF SF   MP    -10/85     PIP    0/100     DIP    0/60     PENNINGTON    235           Strength : R=56.6 lbs; L=107.8 lbs    9/6/2024   strength  Right 56 lbs       Assessment:    Patient presents with a significant improvement in PENN[place] of his right middle finger. He continues to have significant scar rigidity impacting tendon excursion and MCP extension. His scar has become more pliable and softer since his initial evaluation. His right  strength is significantly weaker compared to his non dominant hand. He would benefit from continued therapy to focus on scar mobilization, tendon gliding/pull through, ROM and strengthening. He will benefit from 8 additional visits.       Goals:  (to be met in 16 visits)   1. Pt will be independent and compliant with comprehensive HEP to maintain progress achieved in OT  met  2. Increase PENNINGTON of MF from 180 to 210 for ease of grasping every day items met  3. Increase PENNINGTON of digits 2, 4 and 5 to normal  for ease of grasp  met  4. Decrease scar adherence to minimally rigid for ease of MF ROM and tendon glide  continue   5. Decrease swelling at the MCP joints by 1 cm for ease of ROM continue   6. Pt. Will have at least 40 lbs of  strength ( to be measured as pain subsides) met  7. Improve quick dash to 40 percent   met    New goals: 09/06/2024 to be met in 8 visits   Pt. Will increase right  strength to 65 lbs for ease of gripping tools to perform his job  Increase MP extension of the middle finger to -5 for eased ability to reach into his pocket    Continue goal number 3     QuickDASH Outcome Score  Score: 63.64 % (7/19/2024  8:38 AM)    Post QuickDASH Outcome Score  Post Score: 9.09 % (9/6/2024  7:18 AM)    54.55 % improvement    Plan: Continue skilled Occupational Therapy 1-2 x/week or a total of 8 visits over a 90 day period. Treatment will include: ROM, strengthening, scar mobilization, modalities , therapeutic exercises and activities         Date: 8/20/2024     TX#: 5/16 Date: 8/23/2024  Tx#: 6/16 Date: 8/29/2024  TX#: 7/16 Date: 9/6/2024  TX#:  8/16 Date: 9/13/2024  TX#: 9/16   Hot pack 5 min n/c  Manual Therapy 10 min  Cupping to scar over the MCP joint of the middle finger    IASTM utilizing the Graston technique with instrument number 3 over the scar     Pinch and rolling technique to scar tissue     There ex 30 min  EDC glides     Fabricated keyshawn strap  IF to MF for extension assist during the day    with yellow putty and digital extension with the yellow putty  Yellow putty issued for home  -Hot pack 5 min n/c  -Manual Therapy 10 min  -Cupping to scar over the MCP joint of the middle finger  -IASTM utilizing the Graston technique with instrument number 3 over the scar   -Ultrasound treatment to lateral webspace scar area between D1/D2 w/ settings 3.3 MHz, 1.2 W/cm2, 50% duty for 5 min  -AAROM/PROM stretching of digit MCP/PIP/DIPs -Hot pack 5 min n/c  -Manual Therapy 10 min  -IASTM utilizing  the Graston technique with instrument number 3 over the scar and webspace  -Ultrasound treatment to lateral webspace scar area between D1/D2 w/ settings 3.3 MHz, 1.2 W/cm2, 50% duty for 8 min  -AAROM/PROM stretching of digit MCP/PIP/DIPs  -Joint Mobilization and tendon gliding stretches Hot pack 5 min n/c    Manual Therapy 10 min  Cupping to scar over the MCP joint of the middle finger    IASTM utilizing the Graston technique with instrument number 3 over the scar     Pinch and rolling technique to scar tissue     There ex 30 min  EDC glides   -AAROM/PROM stretching of digit MCP/PIP/DIPs  Tendon gliding  Intrinsic stretches   Measurements/PN   -Hot pack 5 min n/c  -Manual Therapy 10 min  -IASTM utilizing the Graston technique with instrument number 3 over the scar and webspace  -Ultrasound treatment to lateral webspace scar area between D1/D2 w/ settings 3.3 MHz, 1.2 W/cm2, 50% duty for 8 min  -AAROM/PROM stretching of digit MCP/PIP/DIPs  -Joint Mobilization and tendon gliding stretches     HEP: flexor tendon glides, AROM/PROM all digits of the right hand, intrinsic stretches, extrinsic forearm flexor and extensor stretches, place and hold digital extension, pink sponges for digital adduction and flexion, scar mobilization all ex 10 reps each hour   8/16/2024  information provided on the extractor for scar mobilization, instructions for kinesiotape for scar   08/20/2024  yellow putty for  and digital extension 2-3 x per day     Charges: USx1, TEx1      Total Timed Treatment: 30 min  Total Treatment Time: 30 min

## 2024-09-13 NOTE — PROGRESS NOTES
Clinic Note     Assessment/Plan:  43 year old male    Status post irrigation debridement of right dorsal hand and middle finger MCP arthrotomy on 7/3/2024-patient is recovered full flexion but continues to have an extensor lag at the MCP joint about 10 degrees secondary to EDC adhesion to the skin and likely the capsule of the MCP joint.  We will reevaluate the decision to proceed with a tenolysis in 3 months.  If he is having significant functional issues we can consider tenolysis at that point.    Follow Up: As needed    Diagnostic Studies:     None       Physical Exam:     Ht 6' 2\" (1.88 m)   Wt 180 lb (81.6 kg)   BMI 23.11 kg/m²     Constitutional: NAD. AOx3. Well-developed and Well-nourished.   Psychiatric: Normal mood/ affect/ behavior. Judgment and thought content normal.     Right Upper Extremity:     Inspection    Skin incision well-healed.  Swelling much improved.   Palpation    No tenderness to palpation around the MCP and dorsal hand.  Palpable adhesions EDC to the skin      ROM    10-15 degrees of extensor lag, full MCP flexion.  Full composite fist.     Neurovascular    Normal sensation in the median, ulnar, and radial nerve distribution. Normal motor function of muscles innervated by median/AIN, ulnar, and radial/PIN nerves.    Normally perfused hand(s).          CC: Irrigation debridement status post dog bite    HPI: This 43 year old RHD male presents status post irrigation debridement secondary to a dog bite.  Pain and swelling has improved.  He still has significant difficulty with range of motion secondary to pain and stiffness.    Interval Hx (8/1/2024): Swelling and pain is improved.  He does have some residual pain.  He has been able to return to work but is minimally using his right hand.  Still has difficulty with full MCP extension.  He is largely recovered all of his MCP flexion.    Interval Hx (9/13/2024): Patient has some mild functional deficits/issues particularly reaching for  cabinets because he cannot get his finger fully straight.  He is interested in continue to see if over time the ability to extend his finger improves.  If not he may consider tenolysis    Occupation:  for [employer]    History/Other:   No past medical history on file.  Past Surgical History:   Procedure Laterality Date    Other surgical history       Current Outpatient Medications   Medication Sig Dispense Refill    ALPRAZolam 0.25 MG Oral Tab Take 1 tablet (0.25 mg total) by mouth 2 (two) times daily as needed. (Patient not taking: Reported on 9/13/2024) 20 tablet 0    HYDROcodone-acetaminophen 5-325 MG Oral Tab Take 1 tablet by mouth every 6 (six) hours as needed. (Patient not taking: Reported on 9/13/2024) 20 tablet 0    methylPREDNISolone (MEDROL) 4 MG Oral Tablet Therapy Pack As directed. (Patient not taking: Reported on 9/13/2024) 1 each 0    methylPREDNISolone (MEDROL) 4 MG Oral Tablet Therapy Pack As directed. (Patient not taking: Reported on 9/13/2024) 1 each 0     Allergies   Allergen Reactions    Vancomycin FACE FLUSHING     Redness and itching on scalp,face ,neck and upper chest area     Family History   Problem Relation Age of Onset    Heart Disease Maternal Grandfather     Cancer Neg     Stroke Neg      Social History     Occupational History    Not on file   Tobacco Use    Smoking status: Former     Current packs/day: 0.75     Types: Cigarettes     Passive exposure: Never    Smokeless tobacco: Never   Substance and Sexual Activity    Alcohol use: Yes     Comment: 1-2 times a week    Drug use: Not Currently    Sexual activity: Not on file          Review of Systems (negative unless bolded):  General: fevers, chills, fatigue  CV:  chest pain, palpitations, leg swelling  Msk: bodyaches, neck pain, neck stiffness  Skin: rashes, open wounds, nonhealing ulcers  Hem: bleeds easily, bruise easily, immunocompromised  Neuro: dizziness, light headedness, headaches  Psych: anxious, depressed,  anger issues      Wilfredo Mosqueda MD   Hand, Wrist, & Elbow Surgery  [email or web address]  t: [phone]  f: [phone]

## 2024-09-16 ENCOUNTER — APPOINTMENT (OUTPATIENT)
Dept: OCCUPATIONAL MEDICINE | Facility: HOSPITAL | Age: 44
End: 2024-09-16
Attending: ORTHOPAEDIC SURGERY
Payer: COMMERCIAL

## (undated) DIAGNOSIS — Z01.89 ENCOUNTER FOR LOWER EXTREMITY COMPARISON IMAGING STUDY: ICD-10-CM

## (undated) DIAGNOSIS — M25.561 RIGHT KNEE PAIN, UNSPECIFIED CHRONICITY: Primary | ICD-10-CM

## (undated) DEVICE — SLEEVE COMPR MD KNEE LEN SGL USE KENDALL SCD

## (undated) DEVICE — GLOVE SUR 7 SENSICARE PI PIP CRM PWD F

## (undated) DEVICE — SOLUTION IRRIG 3000ML 0.9% NACL FLX CONT

## (undated) DEVICE — DISPOSABLE TOURNIQUET CUFF SINGLE BLADDER, DUAL PORT AND QUICK CONNECT CONNECTOR: Brand: COLOR CUFF

## (undated) DEVICE — GLOVE SUR 6.5 SENSICARE PI PIP CRM PWD F

## (undated) DEVICE — UPPER EXTREMITY CDS-LF: Brand: MEDLINE INDUSTRIES, INC.

## (undated) DEVICE — MINI-BLADE®: Brand: BEAVER®

## (undated) DEVICE — ZZ- DISC- NOSUB-SOLUTION RUBBING 4OZ 70% ISO ALC CLR

## (undated) DEVICE — GLOVE SUR 7.5 SENSICARE PI PIP GRN PWD F

## (undated) DEVICE — DRESSING WET L16XW3IN OIL EMUL N ADH CURAD

## (undated) DEVICE — GOWN,SIRUS,FABRIC-REINFORCED,X-LARGE: Brand: MEDLINE

## (undated) DEVICE — SOLUTION IRRIG 1000ML 0.9% NACL USP BTL

## (undated) DEVICE — SET CYSTO IRRIG L77IN DIA0.241IN BLDR NVENT

## (undated) NOTE — LETTER
Date: 7/3/2024    Patient Name: Moo Cantor          To Whom it may concern:    The above patient was seen at [facility] for treatment of a medical condition.    Patient is currently hospitalization for a medical condition.  Patient was first admitted to the hospital on 7/1/2024.  Patient continues to remain hospitalized as of today.  Please excuse the patient's absence from work.    Sincerely,    Wilfredo Mosqueda MD   Hand, Wrist, & Elbow Surgery  [email or web address]  t: [phone]  f: [phone]

## (undated) NOTE — LETTER
To Whom It May Concern:  This certifies that Moo Cantor was hospitalized from 7/1/2024- 7/8/2024. Patient will be discharged with IV antibiotics for four weeks and has a picc line inserted. With the picc line in place, patient is at risk of developing blood clots with repetitive movements and there is a risk of infection. Please excuse Moo from work for the next four weeks.      Sincerely,      SANTY Bright   [facility] [address]  [address] [address]  [phone]

## (undated) NOTE — LETTER
[address]  [address]  Authorization for Surgical Operation and Procedure     Date:___________                                                                                                         Time:__________  I hereby authorize Surgeon(s):  Wilfredo Mosqueda MD, my physician and his/her assistants (if applicable), which may include medical students, residents, and/or fellows, to perform the following surgical operation/ procedure and administer such anesthesia as may be determined necessary by my physician:  Operation/Procedure name (s) Procedure(s):  IRRIGATION & DEBRIDEMENT RIGHT HAND on Moo CHAVEZ Sakshi   2.   I recognize that during the surgical operation/procedure, unforeseen conditions may necessitate additional or different procedures than those listed above.  I, therefore, further authorize and request that the above-named surgeon, assistants, or designees perform such procedures as are, in their judgment, necessary and desirable.    3.   My surgeon/physician has discussed prior to my surgery the potential benefits, risks and side effects of this procedure; the likelihood of achieving goals; and potential problems that might occur during recuperation.  They also discussed reasonable alternatives to the procedure, including risks, benefits, and side effects related to the alternatives and risks related to not receiving this procedure.  I have had all my questions answered and I acknowledge that no guarantee has been made as to the result that may be obtained.    4.   Should the need arise during my operation/procedure, which includes change of level of care prior to discharge, I also consent to the administration of blood and/or blood products.  Further, I understand that despite careful testing and screening of blood or blood products by collecting agencies, I may still be subject to ill effects as a result of receiving a blood transfusion and/or blood products.   The following are some, but not all, of the potential risks that can occur: fever and allergic reactions, hemolytic reactions, transmission of diseases such as Hepatitis, AIDS and Cytomegalovirus (CMV) and fluid overload.  In the event that I wish to have an autologous transfusion of my own blood, or a directed donor transfusion, I will discuss this with my physician.  Check only if Refusing Blood or Blood Products  I understand refusal of blood or blood products as deemed necessary by my physician may have serious consequences to my condition to include possible death. I hereby assume responsibility for my refusal and release the hospital, its personnel, and my physicians from any responsibility for the consequences of my refusal.          o  Refuse      5.   I authorize the use of any specimen, organs, tissues, body parts or foreign objects that may be removed from my body during the operation/procedure for diagnosis, research or teaching purposes and their subsequent disposal by hospital authorities.  I also authorize the release of specimen test results and/or written reports to my treating physician on the hospital medical staff or other referring or consulting physicians involved in my care, at the discretion of the Pathologist or my treating physician.    6.   I consent to the photographing or videotaping of the operations or procedures to be performed, including appropriate portions of my body for medical, scientific, or educational purposes, provided my identity is not revealed by the pictures or by descriptive texts accompanying them.  If the procedure has been photographed/videotaped, the surgeon will obtain the original picture, image, videotape or CD.  The hospital will not be responsible for storage, release or maintenance of the picture, image, tape or CD.    7.   I consent to the presence of a  or observers in the operating room as deemed necessary by my physician or their  designees.    8.   I recognize that in the event my procedure results in extended X-Ray/fluoroscopy time, I may develop a skin reaction.    9. If I have a Do Not Attempt Resuscitation (DNAR) order in place, that status will be suspended while in the operating room, procedural suite, and during the recovery period unless otherwise explicitly stated by me (or a person authorized to consent on my behalf). The surgeon or my attending physician will determine when the applicable recovery period ends for purposes of reinstating the DNAR order.  10. Patients having a sterilization procedure: I understand that if the procedure is successful the results will be permanent and it will therefore be impossible for me to inseminate, conceive, or bear children.  I also understand that the procedure is intended to result in sterility, although the result has not been guaranteed.   11. I acknowledge that my physician has explained sedation/analgesia administration to me including the risk and benefits I consent to the administration of sedation/analgesia as may be necessary or desirable in the judgment of my physician.    I CERTIFY THAT I HAVE READ AND FULLY UNDERSTAND THE ABOVE CONSENT TO OPERATION and/or OTHER PROCEDURE.    _________________________________________  __________________________________  Signature of Patient     Signature of Responsible Person         ___________________________________         Printed Name of Responsible Person           _________________________________                 Relationship to Patient  _________________________________________  ______________________________  Signature of Witness          Date  Time      Patient Name: CamSCOTT Cantor     : 10/8/1980                 Printed: July 3, 2024     Medical Record #: [id number]                     Page [address] of [address] [place] [address] [place]  [address]    Consent for Anesthesia    I, Moo CHAVEZ  Sakshi agree to be cared for by an anesthesiologist, who is specially trained to monitor me and give me medicine to put me to sleep or keep me comfortable during my procedure    I understand that my anesthesiologist is not an employee or agent of [facility] [employer]. He or she works for [employer].    As the patient asking for anesthesia services, I agree to:  Allow the anesthesiologist (anesthesia doctor) to give me medicine and do additional procedures as necessary. Some examples are: Starting or using an “IV” to give me medicine, fluids or blood during my procedure, and having a breathing tube placed to help me breathe when I’m asleep (intubation). In the event that my heart stops working properly, I understand that my anesthesiologist will make every effort to sustain my life, unless otherwise directed by [facility] Do Not Resuscitate documents.  Tell my anesthesia doctor before my procedure:  If I am pregnant.  The last time that I ate or drank.  All of the medicines I take (including prescriptions, herbal supplements, and pills I can buy without a prescription (including street drugs/illegal medications). Failure to inform my anesthesiologist about these medicines may increase my risk of anesthetic complications.  If I am allergic to anything or have had a reaction to anesthesia before.  I understand how the anesthesia medicine will help me (benefits).  I understand that with any type of anesthesia medicine there are risks:  The most common risks are: nausea, vomiting, sore throat, muscle soreness, damage to my eyes, mouth, or teeth (from breathing tube placement).  Rare risks include: remembering what happened during my procedure, allergic reactions to medications, injury to my airway, heart, lungs, vision, nerves, or muscles and in extremely rare instances death.  My doctor has explained to me other choices available to me for my care (alternatives).  Pregnant  Patients (“epidural”):  I understand that the risks of having an epidural (medicine given into my back to help control pain during labor), include itching, low blood pressure, difficulty urinating, headache or slowing of the baby’s heart. Very rare risks include infection, bleeding, seizure, irregular heart rhythms and nerve injury.  Regional Anesthesia (“spinal”, “epidural”, & “nerve blocks”):  I understand that rare but potential complications include headache, bleeding, infection, seizure, irregular heart rhythms, and nerve injury.    I can change my mind about having anesthesia services at any time before I get the medicine.    _____________________________________________________________________________  Patient (or Representative) Signature/Relationship to Patient  Date   Time    _____________________________________________________________________________   Name (if used)    Language/Organization   Time    _____________________________________________________________________________  Anesthesiologist Signature     Date   Time  I have discussed the procedure and information above with the patient (or patient’s representative) and answered their questions. The patient or their representative has agreed to have anesthesia services.    _____________________________________________________________________________  Witness        Date   Time  I have verified that the signature is that of the patient or patient’s representative, and that it was signed before the procedure  Patient Name: Moo Cantor     : 10/8/1980                 Printed: July 3, 2024     Medical Record #: [id number]                     Page 2 of 2

## (undated) NOTE — LETTER
Date & Time: 6/30/2024, 10:30 AM  Patient: Moo Cantor  Encounter Provider(s):    Bobby Espino APRN       To Whom It May Concern:    Moo Cantor was seen and treated in our department on 6/30/2024. He should not return to work until 7/2/24 .    If you have any questions or concerns, please do not hesitate to call.        _____________________________  Physician/APC Signature

## (undated) NOTE — LETTER
07/08/24    Moo Cantor      To Whom It May Concern:    This letter has been written at the patient's request. The above patient was seen at [facility] for treatment of a medical condition from 7/1/2024 - 7/8/2024.        Sincerely,        Yakov Kaba MD  07/08/24, 12:22 PM